# Patient Record
Sex: FEMALE | Race: WHITE | NOT HISPANIC OR LATINO | Employment: STUDENT | URBAN - METROPOLITAN AREA
[De-identification: names, ages, dates, MRNs, and addresses within clinical notes are randomized per-mention and may not be internally consistent; named-entity substitution may affect disease eponyms.]

---
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
## 2020-01-02 ENCOUNTER — OFFICE VISIT (OUTPATIENT)
Dept: FAMILY MEDICINE CLINIC | Facility: CLINIC | Age: 20
End: 2020-01-02
Payer: COMMERCIAL

## 2020-01-02 VITALS
HEART RATE: 105 BPM | HEIGHT: 67 IN | OXYGEN SATURATION: 98 % | RESPIRATION RATE: 18 BRPM | SYSTOLIC BLOOD PRESSURE: 100 MMHG | BODY MASS INDEX: 19.62 KG/M2 | WEIGHT: 125 LBS | DIASTOLIC BLOOD PRESSURE: 60 MMHG | TEMPERATURE: 97.8 F

## 2020-01-02 DIAGNOSIS — B34.9 VIRAL ILLNESS: Primary | ICD-10-CM

## 2020-01-02 PROCEDURE — 99213 OFFICE O/P EST LOW 20 MIN: CPT | Performed by: NURSE PRACTITIONER

## 2020-01-02 RX ORDER — BENZONATATE 100 MG/1
100 CAPSULE ORAL 3 TIMES DAILY PRN
Qty: 20 CAPSULE | Refills: 0 | Status: SHIPPED | OUTPATIENT
Start: 2020-01-02 | End: 2020-05-08 | Stop reason: ALTCHOICE

## 2020-01-02 NOTE — PROGRESS NOTES
Assessment/Plan:  Supportive care discussed and advised, will follow up for any worsening or no improvement  1  Viral illness  -     benzonatate (TESSALON PERLES) 100 mg capsule; Take 1 capsule (100 mg total) by mouth 3 (three) times a day as needed for cough          BMI Counseling: Body mass index is 19 87 kg/m²  Discussed the patient's BMI with her  Patient Instructions:  Increase fluid intake, saline nasal rinses, and hot tea with honey and lemon  Cool air humidification can be helpful as well  May take Ibuprofen or Tylenol as needed for pain or fevers  Mucinex D for sinus congestion or Coricidin HBP if you have high blood pressure or a heart condition  Mucinex or Robitussin DM are effective for cough and chest congestion  Supportive care discussed and advised  Advised to RTO for any worsening and no improvement  Follow up for no improvement and worsening of conditions  Patient advised and educated when to see immediate medical care  Return if symptoms worsen or fail to improve  No future appointments  Subjective:      Patient ID: Raheel Shen is a 23 y o  female  Chief Complaint   Patient presents with    Cough     harsh productive    Fatigue    Generalized Body Aches    Headache    Dizziness     symptoms for the past 2 days  klpn         Vitals:  /60   Pulse 105   Temp 97 8 °F (36 6 °C)   Resp 18   Ht 5' 6 5" (1 689 m)   Wt 56 7 kg (125 lb)   LMP 12/18/2019 (Approximate)   SpO2 98%   BMI 19 87 kg/m²     HPI  Patient stated that started with cough, fatigue, headache, body aches and dizziness on 12/31/2019  Denies fever, chills and sob  Taking advil but no cough medication            PHQ-9 Depression Screening    PHQ-9:    Frequency of the following problems over the past two weeks:       Little interest or pleasure in doing things:  0 - not at all  Feeling down, depressed, or hopeless:  0 - not at all  PHQ-2 Score:  0             The following portions of the patient's history were reviewed and updated as appropriate: allergies, current medications, past family history, past medical history, past social history, past surgical history and problem list       Review of Systems   Constitutional: Positive for fatigue  Negative for chills, diaphoresis, fever and unexpected weight change  HENT: Negative for congestion, dental problem, drooling, ear discharge, ear pain, facial swelling, hearing loss, mouth sores, nosebleeds, postnasal drip, rhinorrhea, sinus pressure, sinus pain, sneezing, sore throat, tinnitus, trouble swallowing and voice change  Respiratory: Positive for cough  Negative for chest tightness, shortness of breath and wheezing  Cardiovascular: Negative  Gastrointestinal: Negative for abdominal pain, constipation, diarrhea, nausea and vomiting  Musculoskeletal: Positive for myalgias  Skin: Negative  Neurological: Positive for dizziness and headaches  Negative for weakness and light-headedness  Hematological: Negative  Objective:    Social History     Tobacco Use   Smoking Status Never Smoker   Smokeless Tobacco Never Used       Allergies:    Allergies   Allergen Reactions    Clarithromycin          Current Outpatient Medications   Medication Sig Dispense Refill    B Complex Vitamins (B COMPLEX 1 PO) Take 1 capsule by mouth daily      Cholecalciferol (VITAMIN D) 2000 units CAPS Take by mouth daily      cyanocobalamin (VITAMIN B-12) 100 mcg tablet Take 100 mcg by mouth daily      Multiple Vitamins-Minerals (MULTIVITAMIN GUMMIES ADULT PO) Take 1 capsule by mouth daily      benzonatate (TESSALON PERLES) 100 mg capsule Take 1 capsule (100 mg total) by mouth 3 (three) times a day as needed for cough 20 capsule 0    sertraline (ZOLOFT) 25 mg tablet Take 1 tablet (25 mg total) by mouth daily (Patient not taking: Reported on 1/2/2020) 30 tablet 1    tobramycin-dexamethasone (TOBRADEX) ophthalmic suspension Administer 1 drop into the left eye every 4 (four) hours while awake (Patient not taking: Reported on 1/2/2020) 5 mL 0     No current facility-administered medications for this visit  Physical Exam   Constitutional: She is oriented to person, place, and time  She appears well-developed and well-nourished  HENT:   Head: Normocephalic  Right Ear: Tympanic membrane, external ear and ear canal normal    Left Ear: Tympanic membrane, external ear and ear canal normal    Nose: Nose normal  Right sinus exhibits no maxillary sinus tenderness and no frontal sinus tenderness  Left sinus exhibits no maxillary sinus tenderness and no frontal sinus tenderness  Mouth/Throat: Oropharynx is clear and moist and mucous membranes are normal    Neck: Neck supple  Cardiovascular: Normal rate, regular rhythm and normal heart sounds  Pulmonary/Chest: Effort normal and breath sounds normal    Abdominal: Normal appearance and bowel sounds are normal  There is no hepatosplenomegaly  There is no tenderness  There is no rebound  Musculoskeletal: Normal range of motion  Lymphadenopathy:        Right cervical: No superficial cervical and no posterior cervical adenopathy present  Left cervical: No superficial cervical and no posterior cervical adenopathy present  Neurological: She is alert and oriented to person, place, and time  Skin: Skin is warm and dry  Psychiatric: She has a normal mood and affect  Her behavior is normal  Judgment and thought content normal    Vitals reviewed                    PARDEEP Urrutia

## 2020-01-02 NOTE — PATIENT INSTRUCTIONS
Increase fluid intake, saline nasal rinses, and hot tea with honey and lemon  Cool air humidification can be helpful as well  May take Ibuprofen or Tylenol as needed for pain or fevers  Mucinex D for sinus congestion or Coricidin HBP if you have high blood pressure or a heart condition  Mucinex or Robitussin DM are effective for cough and chest congestion  Supportive care discussed and advised  Advised to RTO for any worsening and no improvement  Follow up for no improvement and worsening of conditions  Patient advised and educated when to see immediate medical care

## 2020-01-09 ENCOUNTER — TELEPHONE (OUTPATIENT)
Dept: FAMILY MEDICINE CLINIC | Facility: CLINIC | Age: 20
End: 2020-01-09

## 2020-01-09 ENCOUNTER — OFFICE VISIT (OUTPATIENT)
Dept: FAMILY MEDICINE CLINIC | Facility: CLINIC | Age: 20
End: 2020-01-09
Payer: COMMERCIAL

## 2020-01-09 VITALS
BODY MASS INDEX: 19.3 KG/M2 | HEART RATE: 76 BPM | RESPIRATION RATE: 16 BRPM | SYSTOLIC BLOOD PRESSURE: 100 MMHG | HEIGHT: 67 IN | OXYGEN SATURATION: 98 % | WEIGHT: 123 LBS | DIASTOLIC BLOOD PRESSURE: 60 MMHG | TEMPERATURE: 97.8 F

## 2020-01-09 DIAGNOSIS — J06.9 ACUTE URI: Primary | ICD-10-CM

## 2020-01-09 PROCEDURE — 99213 OFFICE O/P EST LOW 20 MIN: CPT | Performed by: NURSE PRACTITIONER

## 2020-01-09 RX ORDER — CEFUROXIME AXETIL 250 MG/1
250 TABLET ORAL EVERY 12 HOURS SCHEDULED
Qty: 14 TABLET | Refills: 0 | Status: SHIPPED | OUTPATIENT
Start: 2020-01-09 | End: 2020-01-16

## 2020-01-09 RX ORDER — DEXTROMETHORPHAN HYDROBROMIDE AND PROMETHAZINE HYDROCHLORIDE 15; 6.25 MG/5ML; MG/5ML
5 SOLUTION ORAL 4 TIMES DAILY PRN
Qty: 473 ML | Refills: 0 | Status: SHIPPED | OUTPATIENT
Start: 2020-01-09 | End: 2020-05-08 | Stop reason: ALTCHOICE

## 2020-01-09 NOTE — PROGRESS NOTES
Assessment/Plan:    1  Acute URI  -     Promethazine-DM (PHENERGAN-DM) 6 25-15 mg/5 mL oral syrup; Take 5 mL by mouth 4 (four) times a day as needed for cough  -     cefuroxime (CEFTIN) 250 mg tablet; Take 1 tablet (250 mg total) by mouth every 12 (twelve) hours for 7 days          BMI Counseling: Body mass index is 19 56 kg/m²  Discussed the patient's BMI with her  Patient Instructions: Take medication with food  It is important that you take the entire course of antibiotics prescribed  May also take a probiotic of your choice to maintain healthy GI sreekanth  Can take some probiotic and yogurt with the medication  Supportive care discussed and advised  Advised to RTO for any worsening and no improvement  Follow up for no improvement and worsening of conditions  Patient advised and educated when to see immediate medical care  Return if symptoms worsen or fail to improve  No future appointments  Subjective:      Patient ID: Gemini Restrepo is a 23 y o  female  Chief Complaint   Patient presents with    Follow-up     was here last week, flu like symptoms  says she isnt feeling better jlopezcma          Vitals:  /60   Pulse 76   Temp 97 8 °F (36 6 °C)   Resp 16   Ht 5' 6 5" (1 689 m)   Wt 55 8 kg (123 lb)   LMP 01/03/2020 (Exact Date)   SpO2 98%   BMI 19 56 kg/m²     HPI  Patient stated that feeling better but cough is not improving at all  Denies fever, chills and sob  Taking mucinex            PHQ-9 Depression Screening    PHQ-9:    Frequency of the following problems over the past two weeks:       Little interest or pleasure in doing things:  0 - not at all  Feeling down, depressed, or hopeless:  0 - not at all  PHQ-2 Score:  0             The following portions of the patient's history were reviewed and updated as appropriate: allergies, current medications, past family history, past medical history, past social history, past surgical history and problem list       Review of Systems   Constitutional: Negative for chills, diaphoresis, fatigue, fever and unexpected weight change  HENT: Positive for congestion  Negative for dental problem, drooling, ear discharge, ear pain, facial swelling, hearing loss, mouth sores, nosebleeds, postnasal drip, rhinorrhea, sinus pressure, sinus pain, sneezing, sore throat, tinnitus, trouble swallowing and voice change  Respiratory: Positive for cough  Negative for chest tightness, shortness of breath and wheezing  Cardiovascular: Negative  Gastrointestinal: Negative for abdominal pain, constipation, diarrhea, nausea and vomiting  Musculoskeletal: Negative  Skin: Negative  Neurological: Negative for dizziness, weakness, light-headedness and headaches  Hematological: Negative  Objective:    Social History     Tobacco Use   Smoking Status Never Smoker   Smokeless Tobacco Never Used       Allergies:    Allergies   Allergen Reactions    Clarithromycin          Current Outpatient Medications   Medication Sig Dispense Refill    B Complex Vitamins (B COMPLEX 1 PO) Take 1 capsule by mouth daily      Cholecalciferol (VITAMIN D) 2000 units CAPS Take by mouth daily      cyanocobalamin (VITAMIN B-12) 100 mcg tablet Take 100 mcg by mouth daily      Multiple Vitamins-Minerals (MULTIVITAMIN GUMMIES ADULT PO) Take 1 capsule by mouth daily      benzonatate (TESSALON PERLES) 100 mg capsule Take 1 capsule (100 mg total) by mouth 3 (three) times a day as needed for cough (Patient not taking: Reported on 1/9/2020) 20 capsule 0    cefuroxime (CEFTIN) 250 mg tablet Take 1 tablet (250 mg total) by mouth every 12 (twelve) hours for 7 days 14 tablet 0    Promethazine-DM (PHENERGAN-DM) 6 25-15 mg/5 mL oral syrup Take 5 mL by mouth 4 (four) times a day as needed for cough 473 mL 0    sertraline (ZOLOFT) 25 mg tablet Take 1 tablet (25 mg total) by mouth daily (Patient not taking: Reported on 1/2/2020) 30 tablet 1    tobramycin-dexamethasone (TOBRADEX) ophthalmic suspension Administer 1 drop into the left eye every 4 (four) hours while awake (Patient not taking: Reported on 1/2/2020) 5 mL 0     No current facility-administered medications for this visit  Physical Exam   Constitutional: She is oriented to person, place, and time  She appears well-developed and well-nourished  HENT:   Head: Normocephalic  Right Ear: Tympanic membrane, external ear and ear canal normal    Left Ear: Tympanic membrane, external ear and ear canal normal    Nose: Nose normal  Right sinus exhibits no maxillary sinus tenderness and no frontal sinus tenderness  Left sinus exhibits no maxillary sinus tenderness and no frontal sinus tenderness  Mouth/Throat: Oropharynx is clear and moist and mucous membranes are normal    Neck: Neck supple  Cardiovascular: Normal rate, regular rhythm and normal heart sounds  Pulmonary/Chest: Effort normal and breath sounds normal    Abdominal: Normal appearance and bowel sounds are normal  There is no hepatosplenomegaly  There is no tenderness  There is no rebound  Musculoskeletal: Normal range of motion  Lymphadenopathy:        Right cervical: No superficial cervical and no posterior cervical adenopathy present  Left cervical: No superficial cervical and no posterior cervical adenopathy present  Neurological: She is alert and oriented to person, place, and time  Skin: Skin is warm and dry  Psychiatric: She has a normal mood and affect  Her behavior is normal  Judgment and thought content normal    Vitals reviewed                    Enrigue Collet, CRNP

## 2020-01-09 NOTE — TELEPHONE ENCOUNTER
Pharmacist left a message that the Promethazine DM syrup  is unavailable  (back ordered from their )  They are asking that you please change to something else   Sherwin He

## 2020-01-09 NOTE — PATIENT INSTRUCTIONS
Take medication with food  It is important that you take the entire course of antibiotics prescribed  May also take a probiotic of your choice to maintain healthy GI sreekanth  Can take some probiotic and yogurt with the medication  Supportive care discussed and advised  Advised to RTO for any worsening and no improvement  Follow up for no improvement and worsening of conditions  Patient advised and educated when to see immediate medical care

## 2020-01-31 ENCOUNTER — OFFICE VISIT (OUTPATIENT)
Dept: FAMILY MEDICINE CLINIC | Facility: CLINIC | Age: 20
End: 2020-01-31
Payer: COMMERCIAL

## 2020-01-31 VITALS
TEMPERATURE: 97.6 F | BODY MASS INDEX: 19.62 KG/M2 | DIASTOLIC BLOOD PRESSURE: 62 MMHG | RESPIRATION RATE: 16 BRPM | HEART RATE: 92 BPM | HEIGHT: 67 IN | SYSTOLIC BLOOD PRESSURE: 98 MMHG | WEIGHT: 125 LBS

## 2020-01-31 DIAGNOSIS — B97.89 VIRAL RESPIRATORY ILLNESS: Primary | ICD-10-CM

## 2020-01-31 DIAGNOSIS — J98.8 VIRAL RESPIRATORY ILLNESS: Primary | ICD-10-CM

## 2020-01-31 LAB — S PYO AG THROAT QL: NEGATIVE

## 2020-01-31 PROCEDURE — 87880 STREP A ASSAY W/OPTIC: CPT | Performed by: NURSE PRACTITIONER

## 2020-01-31 PROCEDURE — 3008F BODY MASS INDEX DOCD: CPT | Performed by: NURSE PRACTITIONER

## 2020-01-31 PROCEDURE — 99213 OFFICE O/P EST LOW 20 MIN: CPT | Performed by: NURSE PRACTITIONER

## 2020-01-31 NOTE — PROGRESS NOTES
Assessment/Plan:  Supportive care for viral illness discussed  Will follow up if no improvement  1  Viral respiratory illness  -     POCT rapid strepA      Recent Results (from the past 24 hour(s))   POCT rapid strepA    Collection Time: 01/31/20 11:29 AM   Result Value Ref Range     RAPID STREP A Negative Negative         BMI Counseling: Body mass index is 19 72 kg/m²  Discussed the patient's BMI with her  Patient Instructions:  Increase fluid intake, saline nasal rinses, and hot tea with honey and lemon  Cool air humidification can be helpful as well  May take Ibuprofen or Tylenol as needed for pain or fevers  Mucinex D for sinus congestion or Coricidin HBP if you have high blood pressure or a heart condition  Mucinex or Robitussin DM are effective for cough and chest congestion  Supportive care discussed and advised  Advised to RTO for any worsening and no improvement  Follow up for no improvement and worsening of conditions  Patient advised and educated when to see immediate medical care  Return if symptoms worsen or fail to improve  No future appointments  Subjective:      Patient ID: David Morse is a 23 y o  female  Chief Complaint   Patient presents with    Sore Throat     Started a few days ago with sore throat, cough and green phlegm  No known fever  No recent travel  [id number]         Vitals:  BP 98/62   Pulse 92   Temp 97 6 °F (36 4 °C)   Resp 16   Ht 5' 6 75" (1 695 m)   Wt 56 7 kg (125 lb)   LMP 01/03/2020 (Exact Date)   BMI 19 72 kg/m²     HPI  Patient stated that started with sore throat couple of days ago with cough which is productive at times  Denies fever, chills and sob  Not taking any OTC  Stated that noticed some discharge at back of her throat in morning         The following portions of the patient's history were reviewed and updated as appropriate: allergies, current medications, past family history, past medical history, past social history, past surgical history and problem list       Review of Systems   Constitutional: Negative for chills, diaphoresis, fatigue, fever and unexpected weight change  HENT: Positive for congestion and sore throat  Negative for dental problem, drooling, ear discharge, ear pain, facial swelling, hearing loss, mouth sores, nosebleeds, postnasal drip, rhinorrhea, sinus pressure, sinus pain, sneezing, tinnitus, trouble swallowing and voice change  Respiratory: Positive for cough  Negative for chest tightness, shortness of breath and wheezing  Cardiovascular: Negative  Gastrointestinal: Negative for abdominal pain, constipation, diarrhea, nausea and vomiting  Musculoskeletal: Negative  Skin: Negative  Neurological: Negative for dizziness, weakness, light-headedness and headaches  Hematological: Negative  Objective:    Social History     Tobacco Use   Smoking Status Never Smoker   Smokeless Tobacco Never Used       Allergies:    Allergies   Allergen Reactions    Clarithromycin          Current Outpatient Medications   Medication Sig Dispense Refill    B Complex Vitamins (B COMPLEX 1 PO) Take 1 capsule by mouth daily      Cholecalciferol (VITAMIN D) 2000 units CAPS Take by mouth daily      cyanocobalamin (VITAMIN B-12) 100 mcg tablet Take 100 mcg by mouth daily      Multiple Vitamins-Minerals (MULTIVITAMIN GUMMIES ADULT PO) Take 1 capsule by mouth daily      benzonatate (TESSALON PERLES) 100 mg capsule Take 1 capsule (100 mg total) by mouth 3 (three) times a day as needed for cough (Patient not taking: Reported on 1/9/2020) 20 capsule 0    Promethazine-DM (PHENERGAN-DM) 6 25-15 mg/5 mL oral syrup Take 5 mL by mouth 4 (four) times a day as needed for cough (Patient not taking: Reported on 1/31/2020) 473 mL 0    sertraline (ZOLOFT) 25 mg tablet Take 1 tablet (25 mg total) by mouth daily (Patient not taking: Reported on 1/2/2020) 30 tablet 1    tobramycin-dexamethasone (TOBRADEX) ophthalmic suspension Administer 1 drop into the left eye every 4 (four) hours while awake (Patient not taking: Reported on 1/2/2020) 5 mL 0     No current facility-administered medications for this visit  Physical Exam   Constitutional: She is oriented to person, place, and time  She appears well-developed and well-nourished  HENT:   Head: Normocephalic  Right Ear: Tympanic membrane, external ear and ear canal normal    Left Ear: Tympanic membrane, external ear and ear canal normal    Nose: Nose normal  Right sinus exhibits no maxillary sinus tenderness and no frontal sinus tenderness  Left sinus exhibits no maxillary sinus tenderness and no frontal sinus tenderness  Mouth/Throat: Oropharynx is clear and moist and mucous membranes are normal    Neck: Neck supple  Cardiovascular: Normal rate, regular rhythm and normal heart sounds  Pulmonary/Chest: Effort normal and breath sounds normal    Abdominal: Normal appearance and bowel sounds are normal  There is no hepatosplenomegaly  There is no tenderness  There is no rebound  Musculoskeletal: Normal range of motion  Lymphadenopathy:        Right cervical: No superficial cervical and no posterior cervical adenopathy present  Left cervical: No superficial cervical and no posterior cervical adenopathy present  Neurological: She is alert and oriented to person, place, and time  Skin: Skin is warm and dry  Psychiatric: She has a normal mood and affect  Her behavior is normal  Judgment and thought content normal    Vitals reviewed                    PARDEEP Goel

## 2020-02-05 ENCOUNTER — OFFICE VISIT (OUTPATIENT)
Dept: FAMILY MEDICINE CLINIC | Facility: CLINIC | Age: 20
End: 2020-02-05
Payer: COMMERCIAL

## 2020-02-05 VITALS
HEART RATE: 104 BPM | SYSTOLIC BLOOD PRESSURE: 98 MMHG | RESPIRATION RATE: 18 BRPM | BODY MASS INDEX: 19.78 KG/M2 | TEMPERATURE: 98.8 F | HEIGHT: 67 IN | WEIGHT: 126 LBS | DIASTOLIC BLOOD PRESSURE: 60 MMHG

## 2020-02-05 DIAGNOSIS — R05.3 PERSISTENT COUGH: ICD-10-CM

## 2020-02-05 DIAGNOSIS — J02.9 EXUDATIVE PHARYNGITIS: Primary | ICD-10-CM

## 2020-02-05 PROCEDURE — 3008F BODY MASS INDEX DOCD: CPT | Performed by: NURSE PRACTITIONER

## 2020-02-05 PROCEDURE — 99213 OFFICE O/P EST LOW 20 MIN: CPT | Performed by: NURSE PRACTITIONER

## 2020-02-05 PROCEDURE — 1036F TOBACCO NON-USER: CPT | Performed by: NURSE PRACTITIONER

## 2020-02-05 RX ORDER — PREDNISONE 10 MG/1
TABLET ORAL
Qty: 20 TABLET | Refills: 0 | Status: SHIPPED | OUTPATIENT
Start: 2020-02-05 | End: 2020-02-05 | Stop reason: ALTCHOICE

## 2020-02-05 RX ORDER — METHYLPREDNISOLONE 4 MG/1
TABLET ORAL
Qty: 21 TABLET | Refills: 0 | Status: SHIPPED | OUTPATIENT
Start: 2020-02-05 | End: 2020-05-08 | Stop reason: ALTCHOICE

## 2020-02-05 RX ORDER — DOXYCYCLINE HYCLATE 100 MG/1
100 CAPSULE ORAL EVERY 12 HOURS SCHEDULED
Qty: 20 CAPSULE | Refills: 0 | Status: SHIPPED | OUTPATIENT
Start: 2020-02-05 | End: 2020-02-15

## 2020-02-05 NOTE — PROGRESS NOTES
Assessment/Plan:  Strongly recommended to get chest xray done to diagnose possible pneumonia and refusing and stated that does not want bill and not necessary but aware the purpose of it  Advised to follow up with ENT if no improvement after current treatment as had recurrent URI's     1  Exudative pharyngitis  -     doxycycline hyclate (VIBRAMYCIN) 100 mg capsule; Take 1 capsule (100 mg total) by mouth every 12 (twelve) hours for 10 days  -     Ambulatory Referral to Otolaryngology; Future  -     methylPREDNISolone 4 MG tablet therapy pack; Use as directed on package    2  Persistent cough  -     doxycycline hyclate (VIBRAMYCIN) 100 mg capsule; Take 1 capsule (100 mg total) by mouth every 12 (twelve) hours for 10 days  -     Ambulatory Referral to Otolaryngology; Future  -     methylPREDNISolone 4 MG tablet therapy pack; Use as directed on package          BMI Counseling: Body mass index is 19 88 kg/m²  Discussed the patient's BMI with her  Patient Instructions: Take medication with food  It is important that you take the entire course of antibiotics prescribed  May also take a probiotic of your choice to maintain healthy GI sreekanth  Can take some probiotic and yogurt with the medication  Supportive care discussed and advised  Advised to RTO for any worsening and no improvement  Follow up for no improvement and worsening of conditions  Patient advised and educated when to see immediate medical care  Return if symptoms worsen or fail to improve  No future appointments  Subjective:      Patient ID: Herbert Martinez is a 23 y o  female      Chief Complaint   Patient presents with    Cough     for 1 week prcma     Nasal Congestion    Sore Throat         Vitals:  BP 98/60   Pulse 104   Temp 98 8 °F (37 1 °C)   Resp 18   Ht 5' 6 75" (1 695 m)   Wt 57 2 kg (126 lb)   BMI 19 88 kg/m²     HPI  Patient was seen couple of days ago and stated that her cough, sore throat and congestion is worse   Stated that having yellowish discharge from her nose  Denies fever, chills and sob  Not taking any OTC  The following portions of the patient's history were reviewed and updated as appropriate: allergies, current medications, past family history, past medical history, past social history, past surgical history and problem list       Review of Systems   Constitutional: Negative for chills, diaphoresis, fatigue, fever and unexpected weight change  HENT: Positive for congestion and sore throat  Negative for dental problem, drooling, ear discharge, ear pain, facial swelling, hearing loss, mouth sores, nosebleeds, postnasal drip, rhinorrhea, sinus pressure, sinus pain, sneezing, tinnitus, trouble swallowing and voice change  Respiratory: Positive for cough  Negative for chest tightness, shortness of breath and wheezing  Cardiovascular: Negative  Gastrointestinal: Negative for abdominal pain, constipation, diarrhea, nausea and vomiting  Musculoskeletal: Negative  Skin: Negative  Neurological: Negative for dizziness, weakness, light-headedness and headaches  Hematological: Negative  Objective:    Social History     Tobacco Use   Smoking Status Never Smoker   Smokeless Tobacco Never Used       Allergies:    Allergies   Allergen Reactions    Clarithromycin          Current Outpatient Medications   Medication Sig Dispense Refill    B Complex Vitamins (B COMPLEX 1 PO) Take 1 capsule by mouth daily      Cholecalciferol (VITAMIN D) 2000 units CAPS Take by mouth daily      cyanocobalamin (VITAMIN B-12) 100 mcg tablet Take 100 mcg by mouth daily      Multiple Vitamins-Minerals (MULTIVITAMIN GUMMIES ADULT PO) Take 1 capsule by mouth daily      benzonatate (TESSALON PERLES) 100 mg capsule Take 1 capsule (100 mg total) by mouth 3 (three) times a day as needed for cough (Patient not taking: Reported on 1/9/2020) 20 capsule 0    doxycycline hyclate (VIBRAMYCIN) 100 mg capsule Take 1 capsule (100 mg total) by mouth every 12 (twelve) hours for 10 days 20 capsule 0    methylPREDNISolone 4 MG tablet therapy pack Use as directed on package 21 tablet 0    Promethazine-DM (PHENERGAN-DM) 6 25-15 mg/5 mL oral syrup Take 5 mL by mouth 4 (four) times a day as needed for cough (Patient not taking: Reported on 1/31/2020) 473 mL 0    sertraline (ZOLOFT) 25 mg tablet Take 1 tablet (25 mg total) by mouth daily (Patient not taking: Reported on 1/2/2020) 30 tablet 1    tobramycin-dexamethasone (TOBRADEX) ophthalmic suspension Administer 1 drop into the left eye every 4 (four) hours while awake (Patient not taking: Reported on 1/2/2020) 5 mL 0     No current facility-administered medications for this visit  Physical Exam   Constitutional: She is oriented to person, place, and time  She appears well-developed and well-nourished  HENT:   Head: Normocephalic  Right Ear: Tympanic membrane, external ear and ear canal normal    Left Ear: Tympanic membrane, external ear and ear canal normal    Nose: Nose normal  Right sinus exhibits no maxillary sinus tenderness and no frontal sinus tenderness  Left sinus exhibits no maxillary sinus tenderness and no frontal sinus tenderness  Mouth/Throat: Mucous membranes are normal  Oropharyngeal exudate and posterior oropharyngeal erythema present  No posterior oropharyngeal edema  Neck: Neck supple  Cardiovascular: Normal rate, regular rhythm and normal heart sounds  Pulmonary/Chest: Effort normal and breath sounds normal    Abdominal: Normal appearance and bowel sounds are normal  There is no hepatosplenomegaly  There is no tenderness  There is no rebound  Musculoskeletal: Normal range of motion  Lymphadenopathy:        Right cervical: No superficial cervical and no posterior cervical adenopathy present  Left cervical: No superficial cervical and no posterior cervical adenopathy present     Neurological: She is alert and oriented to person, place, and time  Skin: Skin is warm and dry  Psychiatric: She has a normal mood and affect  Her behavior is normal  Judgment and thought content normal    Vitals reviewed                    PARDEEP Fox

## 2020-03-05 ENCOUNTER — OFFICE VISIT (OUTPATIENT)
Dept: FAMILY MEDICINE CLINIC | Facility: CLINIC | Age: 20
End: 2020-03-05
Payer: COMMERCIAL

## 2020-03-05 VITALS
OXYGEN SATURATION: 98 % | SYSTOLIC BLOOD PRESSURE: 98 MMHG | TEMPERATURE: 98.4 F | WEIGHT: 129 LBS | HEIGHT: 67 IN | HEART RATE: 92 BPM | BODY MASS INDEX: 20.25 KG/M2 | RESPIRATION RATE: 16 BRPM | DIASTOLIC BLOOD PRESSURE: 60 MMHG

## 2020-03-05 DIAGNOSIS — R21 RASH: Primary | ICD-10-CM

## 2020-03-05 PROCEDURE — 99213 OFFICE O/P EST LOW 20 MIN: CPT | Performed by: NURSE PRACTITIONER

## 2020-03-05 PROCEDURE — 3008F BODY MASS INDEX DOCD: CPT | Performed by: NURSE PRACTITIONER

## 2020-03-05 PROCEDURE — 1036F TOBACCO NON-USER: CPT | Performed by: NURSE PRACTITIONER

## 2020-03-05 RX ORDER — CLOTRIMAZOLE AND BETAMETHASONE DIPROPIONATE 10; .64 MG/G; MG/G
CREAM TOPICAL 2 TIMES DAILY
Qty: 45 G | Refills: 0 | Status: SHIPPED | OUTPATIENT
Start: 2020-03-05 | End: 2020-05-08 | Stop reason: ALTCHOICE

## 2020-03-05 NOTE — PROGRESS NOTES
Assessment/Plan:    1  Rash  -     clotrimazole-betamethasone (LOTRISONE) 1-0 05 % cream; Apply topically 2 (two) times a day Short term, sparingly to area: on left axillary          BMI Counseling: Body mass index is 20 36 kg/m²  Discussed the patient's BMI with her  Patient Instructions: Take shower immediately after gym  Air dry area  Supportive care discussed and advised  Advised to RTO for any worsening and no improvement  Follow up for no improvement and worsening of conditions  Patient advised and educated when to see immediate medical care  Return if symptoms worsen or fail to improve  No future appointments  Subjective:      Patient ID: Gloria Spangler is a 23 y o  female  Chief Complaint   Patient presents with    Rash     rash in left armpit for about a week jlopezcma          Vitals:  BP 98/60   Pulse 92   Temp 98 4 °F (36 9 °C)   Resp 16   Ht 5' 6 75" (1 695 m)   Wt 58 5 kg (129 lb)   LMP 02/05/2020 (Approximate)   SpO2 98%   BMI 20 36 kg/m²     HPI  Patient stated that started with rash in left axillary area about a week ago  Stated that been going to gym and sweating a lot  Rash is itchy  Denies any fever, and chills  Stated that also feeling mild discomfort on the left eye upper eyelid like heaviness but no vision changes        PHQ-9 Depression Screening    PHQ-9:    Frequency of the following problems over the past two weeks:       Little interest or pleasure in doing things:  0 - not at all  Feeling down, depressed, or hopeless:  0 - not at all  PHQ-2 Score:  0             The following portions of the patient's history were reviewed and updated as appropriate: allergies, current medications, past family history, past medical history, past social history, past surgical history and problem list       Review of Systems   Constitutional: Negative  Eyes:        As noted in HPI     Respiratory: Negative  Cardiovascular: Negative      Genitourinary: Negative  Skin: Positive for rash  Psychiatric/Behavioral: Negative  Objective:    Social History     Tobacco Use   Smoking Status Never Smoker   Smokeless Tobacco Never Used       Allergies: Allergies   Allergen Reactions    Clarithromycin          Current Outpatient Medications   Medication Sig Dispense Refill    B Complex Vitamins (B COMPLEX 1 PO) Take 1 capsule by mouth daily      Cholecalciferol (VITAMIN D) 2000 units CAPS Take by mouth daily      cyanocobalamin (VITAMIN B-12) 100 mcg tablet Take 100 mcg by mouth daily      Multiple Vitamins-Minerals (MULTIVITAMIN GUMMIES ADULT PO) Take 1 capsule by mouth daily      benzonatate (TESSALON PERLES) 100 mg capsule Take 1 capsule (100 mg total) by mouth 3 (three) times a day as needed for cough (Patient not taking: Reported on 1/9/2020) 20 capsule 0    clotrimazole-betamethasone (LOTRISONE) 1-0 05 % cream Apply topically 2 (two) times a day Short term, sparingly to area: on left axillary 45 g 0    methylPREDNISolone 4 MG tablet therapy pack Use as directed on package (Patient not taking: Reported on 3/5/2020) 21 tablet 0    Promethazine-DM (PHENERGAN-DM) 6 25-15 mg/5 mL oral syrup Take 5 mL by mouth 4 (four) times a day as needed for cough (Patient not taking: Reported on 1/31/2020) 473 mL 0    sertraline (ZOLOFT) 25 mg tablet Take 1 tablet (25 mg total) by mouth daily (Patient not taking: Reported on 1/2/2020) 30 tablet 1    tobramycin-dexamethasone (TOBRADEX) ophthalmic suspension Administer 1 drop into the left eye every 4 (four) hours while awake (Patient not taking: Reported on 1/2/2020) 5 mL 0     No current facility-administered medications for this visit  Physical Exam   Constitutional: She is oriented to person, place, and time  She appears well-developed and well-nourished  Eyes: Pupils are equal, round, and reactive to light   Conjunctivae and lids are normal    Cardiovascular: Normal rate, regular rhythm and normal heart sounds  Pulmonary/Chest: Effort normal and breath sounds normal    Neurological: She is alert and oriented to person, place, and time  Skin: Skin is warm and dry  Rash noted  Pruritic erythematous maculopapular rash noted on left axillary area about 8 cm in diameter  Psychiatric: She has a normal mood and affect   Her behavior is normal  Judgment and thought content normal                    PARDEEP Jiménez

## 2020-03-05 NOTE — PATIENT INSTRUCTIONS
Take shower immediately after gym  Air dry area  Supportive care discussed and advised  Advised to RTO for any worsening and no improvement  Follow up for no improvement and worsening of conditions  Patient advised and educated when to see immediate medical care

## 2020-05-01 ENCOUNTER — TELEPHONE (OUTPATIENT)
Dept: FAMILY MEDICINE CLINIC | Facility: CLINIC | Age: 20
End: 2020-05-01

## 2020-05-06 ENCOUNTER — TELEPHONE (OUTPATIENT)
Dept: FAMILY MEDICINE CLINIC | Facility: CLINIC | Age: 20
End: 2020-05-06

## 2020-05-08 ENCOUNTER — OFFICE VISIT (OUTPATIENT)
Dept: FAMILY MEDICINE CLINIC | Facility: CLINIC | Age: 20
End: 2020-05-08
Payer: COMMERCIAL

## 2020-05-08 VITALS
HEART RATE: 84 BPM | HEIGHT: 67 IN | TEMPERATURE: 99.1 F | WEIGHT: 140 LBS | DIASTOLIC BLOOD PRESSURE: 60 MMHG | SYSTOLIC BLOOD PRESSURE: 96 MMHG | BODY MASS INDEX: 21.97 KG/M2

## 2020-05-08 DIAGNOSIS — D36.9 DERMOID CYST: Primary | ICD-10-CM

## 2020-05-08 DIAGNOSIS — F41.1 GENERALIZED ANXIETY DISORDER: ICD-10-CM

## 2020-05-08 PROCEDURE — 1036F TOBACCO NON-USER: CPT | Performed by: FAMILY MEDICINE

## 2020-05-08 PROCEDURE — 99213 OFFICE O/P EST LOW 20 MIN: CPT | Performed by: FAMILY MEDICINE

## 2020-05-08 PROCEDURE — 3008F BODY MASS INDEX DOCD: CPT | Performed by: FAMILY MEDICINE

## 2020-05-08 RX ORDER — ESCITALOPRAM OXALATE 10 MG/1
10 TABLET ORAL DAILY
Qty: 30 TABLET | Refills: 1 | Status: SHIPPED | OUTPATIENT
Start: 2020-05-08 | End: 2020-06-01

## 2020-06-01 DIAGNOSIS — F41.1 GENERALIZED ANXIETY DISORDER: ICD-10-CM

## 2020-06-01 RX ORDER — ESCITALOPRAM OXALATE 10 MG/1
TABLET ORAL
Qty: 30 TABLET | Refills: 1 | Status: SHIPPED | OUTPATIENT
Start: 2020-06-01 | End: 2020-06-08

## 2020-06-07 DIAGNOSIS — F41.1 GENERALIZED ANXIETY DISORDER: ICD-10-CM

## 2020-06-08 RX ORDER — ESCITALOPRAM OXALATE 10 MG/1
10 TABLET ORAL DAILY
Qty: 90 TABLET | Refills: 0 | Status: SHIPPED | OUTPATIENT
Start: 2020-06-08 | End: 2020-09-01 | Stop reason: ALTCHOICE

## 2020-06-08 RX ORDER — ESCITALOPRAM OXALATE 10 MG/1
TABLET ORAL
Qty: 90 TABLET | Refills: 0 | Status: SHIPPED | OUTPATIENT
Start: 2020-06-08 | End: 2020-06-08 | Stop reason: SDUPTHER

## 2020-06-15 ENCOUNTER — TELEPHONE (OUTPATIENT)
Dept: FAMILY MEDICINE CLINIC | Facility: CLINIC | Age: 20
End: 2020-06-15

## 2020-06-26 ENCOUNTER — OFFICE VISIT (OUTPATIENT)
Dept: FAMILY MEDICINE CLINIC | Facility: CLINIC | Age: 20
End: 2020-06-26
Payer: COMMERCIAL

## 2020-06-26 VITALS
HEART RATE: 82 BPM | SYSTOLIC BLOOD PRESSURE: 104 MMHG | TEMPERATURE: 97.3 F | WEIGHT: 135 LBS | BODY MASS INDEX: 21.19 KG/M2 | RESPIRATION RATE: 18 BRPM | HEIGHT: 67 IN | DIASTOLIC BLOOD PRESSURE: 72 MMHG
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
## 2020-06-26 PROBLEM — J20.9 ACUTE BRONCHITIS: Status: RESOLVED | Noted: 2018-12-12 | Resolved: 2020-06-26

## 2020-06-26 PROBLEM — H60.391 INFECTION OF RIGHT EAR LOBE: Status: RESOLVED | Noted: 2018-12-12 | Resolved: 2020-06-26

## 2020-06-26 PROCEDURE — 1036F TOBACCO NON-USER: CPT | Performed by: NURSE PRACTITIONER

## 2020-06-26 PROCEDURE — 99213 OFFICE O/P EST LOW 20 MIN: CPT | Performed by: NURSE PRACTITIONER

## 2020-06-26 PROCEDURE — 3008F BODY MASS INDEX DOCD: CPT | Performed by: NURSE PRACTITIONER

## 2020-06-26 RX ORDER — DOXYCYCLINE HYCLATE 100 MG/1
200 CAPSULE ORAL ONCE
Qty: 2 CAPSULE | Refills: 0 | Status: SHIPPED | OUTPATIENT
Start: 2020-06-26 | End: 2020-06-26

## 2020-08-31 ENCOUNTER — TELEPHONE (OUTPATIENT)
Dept: FAMILY MEDICINE CLINIC | Facility: CLINIC | Age: 20
End: 2020-08-31

## 2020-09-01 ENCOUNTER — OFFICE VISIT (OUTPATIENT)
Dept: FAMILY MEDICINE CLINIC | Facility: CLINIC | Age: 20
End: 2020-09-01
Payer: COMMERCIAL

## 2020-09-01 VITALS
TEMPERATURE: 95.6 F | HEART RATE: 80 BPM | WEIGHT: 141 LBS | HEIGHT: 67 IN | SYSTOLIC BLOOD PRESSURE: 96 MMHG | BODY MASS INDEX: 22.13 KG/M2 | DIASTOLIC BLOOD PRESSURE: 66 MMHG | RESPIRATION RATE: 16 BRPM

## 2020-09-01 DIAGNOSIS — Z00.00 ROUTINE ADULT HEALTH MAINTENANCE: Primary | ICD-10-CM

## 2020-09-01 DIAGNOSIS — F41.1 GENERALIZED ANXIETY DISORDER: ICD-10-CM

## 2020-09-01 DIAGNOSIS — Z11.3 SCREENING EXAMINATION FOR STD (SEXUALLY TRANSMITTED DISEASE): ICD-10-CM

## 2020-09-01 PROCEDURE — 3725F SCREEN DEPRESSION PERFORMED: CPT | Performed by: NURSE PRACTITIONER

## 2020-09-01 PROCEDURE — 1036F TOBACCO NON-USER: CPT | Performed by: NURSE PRACTITIONER

## 2020-09-01 PROCEDURE — 99395 PREV VISIT EST AGE 18-39: CPT | Performed by: NURSE PRACTITIONER

## 2020-09-01 RX ORDER — SERTRALINE HYDROCHLORIDE 25 MG/1
25 TABLET, FILM COATED ORAL DAILY
Qty: 30 TABLET | Refills: 1 | Status: SHIPPED | OUTPATIENT
Start: 2020-09-01 | End: 2020-09-30

## 2020-09-01 NOTE — ASSESSMENT & PLAN NOTE
Did not tolerate Lexapro  She would like to try Zoloft again  She doesn't think she gave it enough time  Will have her RTO in 4-6 weeks for med check

## 2020-09-01 NOTE — PATIENT INSTRUCTIONS

## 2020-09-01 NOTE — PROGRESS NOTES
FAMILY PRACTICE HEALTH MAINTENANCE OFFICE VISIT  [facility]    NAME: Jeffy Carl  AGE: 21 y o  SEX: female  : 2000     DATE: 2020    Assessment and Plan     1  Routine adult health maintenance    2  Generalized anxiety disorder  Assessment & Plan:  Did not tolerate Lexapro  She would like to try Zoloft again  She doesn't think she gave it enough time  Will have her RTO in 4-6 weeks for med check  Orders:  -     sertraline (ZOLOFT) 25 mg tablet; Take 1 tablet (25 mg total) by mouth daily    3  Screening examination for STD (sexually transmitted disease)  -     Chlamydia/GC amplified DNA by PCR; Future  -     HIV 1/2 w/ Reflex (Multispot); Future  -     Hepatitis panel, acute; Future  -     RPR; Future  -     HIV 1/2 w/ Reflex (Multispot); Future; Expected date: 2021      · Patient Counseling:   · Nutrition: Stressed importance of a well balanced diet, moderation of sodium/saturated fat, caloric balance and sufficient intake of fiber  · Exercise: Stressed the importance of regular exercise with a goal of 150 minutes per week  · Dental Health: Discussed daily flossing and brushing and regular dental visits     · Immunizations reviewed: Declined recommended vaccinations  · Discussed benefits of:  Cervical Cancer screening   BMI Counseling: Body mass index is 22 42 kg/m²  Discussed with patient's BMI with her  The BMI is normal      Return in about 4 weeks (around 2020) for Recheck  Chief Complaint     Chief Complaint   Patient presents with    Physical Exam     lj       History of Present Illness     Here today for CPE with college forms  Attending [address] for [employer]  Feeling well  Requesting STD screening  She was intimate with a partner who had previously used heroin, approx 4 years prior to their relationship  Would like to try Zoloft again for her anxiety    She was previously on Lexapro, but it made her very debby  She was prescribed Zoloft in the past, but only took it for a week  Well Adult Physical   Patient here for a comprehensive physical exam       Diet and Physical Activity  Diet: well balanced diet and vegan    Exercise: frequently      Depression Screen  PHQ-9 Depression Screening    PHQ-9:    Frequency of the following problems over the past two weeks:       Little interest or pleasure in doing things:  0 - not at all  Feeling down, depressed, or hopeless:  0 - not at all  PHQ-2 Score:  0          General Health  Hearing: Normal:  bilateral  Vision: goes for regular eye exams, most recent eye exam <1 year and wears glasses  Dental: regular dental visits and brushes teeth twice daily    Reproductive Health  No issues , Regular Periods and Follows with gynecologist      The following portions of the patient's history were reviewed and updated as appropriate: allergies, current medications, past family history, past medical history, past social history, past surgical history and problem list     Review of Systems     Review of Systems   Constitutional: Negative  Respiratory: Negative  Cardiovascular: Negative  Gastrointestinal: Negative  Neurological: Negative  Psychiatric/Behavioral: Negative          Past Medical History     Past Medical History:   Diagnosis Date    Complication of left ear piercing     last assessed: 09/11/17    Exposure to blood or body fluid     last assessed: 01/31/17    Moderate major depression ([address] )     last assessed: 07/25/17       Past Surgical History     Past Surgical History:   Procedure Laterality Date    FRACTURE SURGERY Left     femur       Social History     Social History     Socioeconomic History    Marital status: Single     Spouse name: None    Number of children: None    Years of education: None    Highest education level: None   Occupational History    None   Social Needs    Financial resource strain: None    Food insecurity     Worry: None     Inability: None    Transportation needs     Medical: None     Non-medical: None   Tobacco Use    Smoking status: Never Smoker    Smokeless tobacco: Never Used   Substance and Sexual Activity    Alcohol use: No    Drug use: No    Sexual activity: Yes     Partners: Male     Comment: pt declines hiv std testing   Lifestyle    Physical activity     Days per week: None     Minutes per session: None    Stress: None   Relationships    Social connections     Talks on phone: None     Gets together: None     Attends Rastafarian service: None     Active member of club or organization: None     Attends meetings of clubs or organizations: None     Relationship status: None    Intimate partner violence     Fear of current or ex partner: None     Emotionally abused: None     Physically abused: None     Forced sexual activity: None   Other Topics Concern    None   Social History Narrative    None       Family History     Family History   Problem Relation Age of Onset    Other Mother         Seizures    Seasonal affective disorder Mother     Gout Father     Hypertension Father        Current Medications       Current Outpatient Medications:     B Complex Vitamins (B COMPLEX 1 PO), Take 1 capsule by mouth daily, Disp: , Rfl:     Cholecalciferol (VITAMIN D) 2000 units CAPS, Take by mouth daily, Disp: , Rfl:     cyanocobalamin (VITAMIN B-12) 100 mcg tablet, Take 100 mcg by mouth daily, Disp: , Rfl:     Multiple Vitamins-Minerals (MULTIVITAMIN GUMMIES ADULT PO), Take 1 capsule by mouth daily, Disp: , Rfl:     sertraline (ZOLOFT) 25 mg tablet, Take 1 tablet (25 mg total) by mouth daily, Disp: 30 tablet, Rfl: 1     Allergies     Allergies   Allergen Reactions    Clarithromycin        Objective     BP 96/66   Pulse 80   Temp (!) 95 6 °F (35 3 °C)   Resp 16   Ht 5' 6 5" (1 689 m)   Wt 64 kg (141 lb)   BMI 22 42 kg/m²      Physical Exam  Vitals signs and nursing note reviewed     Constitutional: Appearance: She is well-developed  HENT:      Head: Normocephalic  Right Ear: External ear normal       Left Ear: External ear normal       Nose: Nose normal    Eyes:      Conjunctiva/sclera: Conjunctivae normal       Pupils: Pupils are equal, round, and reactive to light  Neck:      Musculoskeletal: Neck supple  Thyroid: No thyromegaly  Cardiovascular:      Rate and Rhythm: Normal rate and regular rhythm  Heart sounds: Normal heart sounds  No murmur  Pulmonary:      Effort: Pulmonary effort is normal       Breath sounds: Normal breath sounds  Abdominal:      General: Bowel sounds are normal  There is no distension  Palpations: Abdomen is soft  Musculoskeletal: Normal range of motion  Lymphadenopathy:      Cervical: No cervical adenopathy  Skin:     General: Skin is warm and dry  Neurological:      Mental Status: She is alert and oriented to person, place, and time  Deep Tendon Reflexes: Reflexes are normal and symmetric             Vision Screening Comments: Pt forgot her glasses--unable to correctly see line three--radha Cancino, [address]

## 2020-09-30 DIAGNOSIS — F41.1 GENERALIZED ANXIETY DISORDER: ICD-10-CM

## 2020-09-30 RX ORDER — SERTRALINE HYDROCHLORIDE 25 MG/1
TABLET, FILM COATED ORAL
Qty: 30 TABLET | Refills: 1 | Status: SHIPPED | OUTPATIENT
Start: 2020-09-30 | End: 2020-10-26 | Stop reason: SDUPTHER

## 2020-10-06 ENCOUNTER — TELEPHONE (OUTPATIENT)
Dept: FAMILY MEDICINE CLINIC | Facility: CLINIC | Age: 20
End: 2020-10-06

## 2020-10-08 NOTE — TELEPHONE ENCOUNTER
----- Message from Priscila Spence, RN sent at 10/23/2018  3:54 PM EDT -----  Regarding: needs appt w lamb  lmom      Documentation     Leopoldo Pinta 23 hours ago (4:30 PM)          Pt lmom  lmom for patient to call back tomorrow      Documentation     Leopoldo Pinta 23 hours ago (3:56 PM)          Per Dr Anny Casas, pt needs appt with her or Dr Lillian Brooke  lmom for pt to schedule      Documentation     Lexx Pacheco (3:37 PM)          [facility]  [facility] and transferred to their in house therapist to see if has recommendations or if she does outpt therapy sessions  Documentation     Leopoldo Pinta routed conversation to [facility] Yesterday (3:12 PM)    Leopoldo Pinta Yesterday (3:11 PM)          Looking for recommendation of sex therapist  States number given is longer in service  Mother  Still living? Unknown  Family history of hypertension, Age at diagnosis: Age Unknown

## 2020-10-26 DIAGNOSIS — F41.1 GENERALIZED ANXIETY DISORDER: ICD-10-CM

## 2020-10-26 RX ORDER — SERTRALINE HYDROCHLORIDE 25 MG/1
25 TABLET, FILM COATED ORAL DAILY
Qty: 30 TABLET | Refills: 1 | Status: SHIPPED | OUTPATIENT
Start: 2020-10-26 | End: 2020-11-23

## 2020-11-22 DIAGNOSIS — F41.1 GENERALIZED ANXIETY DISORDER: ICD-10-CM

## 2020-11-23 RX ORDER — SERTRALINE HYDROCHLORIDE 25 MG/1
TABLET, FILM COATED ORAL
Qty: 30 TABLET | Refills: 1 | Status: SHIPPED | OUTPATIENT
Start: 2020-11-23 | End: 2020-12-10 | Stop reason: SDUPTHER

## 2020-12-10 DIAGNOSIS — F41.1 GENERALIZED ANXIETY DISORDER: ICD-10-CM

## 2020-12-10 RX ORDER — SERTRALINE HYDROCHLORIDE 25 MG/1
25 TABLET, FILM COATED ORAL DAILY
Qty: 90 TABLET | Refills: 1 | Status: SHIPPED | OUTPATIENT
Start: 2020-12-10 | End: 2021-01-22

## 2020-12-21 LAB
C TRACH RRNA SPEC QL NAA+PROBE: NOT DETECTED
HAV IGM SERPL QL IA: NORMAL
HBV CORE IGM SERPL QL IA: NORMAL
HBV SURFACE AG SERPL QL IA: NORMAL
HCV AB S/CO SERPL IA: 0.02
HCV AB SERPL QL IA: NORMAL
HIV 1+2 AB+HIV1 P24 AG SERPL QL IA: NORMAL
N GONORRHOEA RRNA SPEC QL NAA+PROBE: NOT DETECTED
RPR SER QL: NORMAL

## 2021-01-22 ENCOUNTER — OFFICE VISIT (OUTPATIENT)
Dept: FAMILY MEDICINE CLINIC | Facility: CLINIC | Age: 21
End: 2021-01-22
Payer: COMMERCIAL

## 2021-01-22 VITALS
WEIGHT: 141 LBS | BODY MASS INDEX: 22.13 KG/M2 | HEIGHT: 67 IN | HEART RATE: 83 BPM | OXYGEN SATURATION: 95 % | TEMPERATURE: 97 F | SYSTOLIC BLOOD PRESSURE: 106 MMHG | DIASTOLIC BLOOD PRESSURE: 68 MMHG | RESPIRATION RATE: 18 BRPM

## 2021-01-22 DIAGNOSIS — Z30.41 ORAL CONTRACEPTIVE PILL SURVEILLANCE: Primary | ICD-10-CM

## 2021-01-22 PROCEDURE — 3008F BODY MASS INDEX DOCD: CPT | Performed by: NURSE PRACTITIONER

## 2021-01-22 PROCEDURE — 99213 OFFICE O/P EST LOW 20 MIN: CPT | Performed by: NURSE PRACTITIONER

## 2021-01-22 PROCEDURE — 1036F TOBACCO NON-USER: CPT | Performed by: NURSE PRACTITIONER

## 2021-01-22 PROCEDURE — 3725F SCREEN DEPRESSION PERFORMED: CPT | Performed by: NURSE PRACTITIONER

## 2021-01-22 RX ORDER — ACETAMINOPHEN AND CODEINE PHOSPHATE 120; 12 MG/5ML; MG/5ML
1 SOLUTION ORAL DAILY
Qty: 90 TABLET | Refills: 1 | Status: SHIPPED | OUTPATIENT
Start: 2021-01-22 | End: 2021-04-14 | Stop reason: SDUPTHER

## 2021-01-22 NOTE — PROGRESS NOTES
Assessment/Plan:    Discussed different birth control options, pt would like to resume Micronor  She is currently menstruating, so will start OCPs after she gets them  Will have her f/u with ob/gyn for pap and further management  1  Oral contraceptive pill surveillance  -     norethindrone (MICRONOR) 0 35 MG tablet; Take 1 tablet (0 35 mg total) by mouth daily            Patient Instructions   Please follow up with your gynecologist after your 21st birthday for cervical cancer screening and to discuss your birth control  Return if symptoms worsen or fail to improve  Subjective:      Patient ID: Laurence Mehta is a 21 y o  female  Chief Complaint   Patient presents with    Contraception     rmklpn       Pt is here today to discuss starting OCPs again  She stopped last year after she broke up with her boyfriend  She is now sexually active again and would like to back on the pil  Was previously managed by her ob/gyn, who she still plans on continuing to see  She was on Micronor due to lower risk for DVT, although she does not have any risk factors  She tolerated this well in the past   She is currently using condoms regularly  The following portions of the patient's history were reviewed and updated as appropriate: allergies, current medications, past family history, past medical history, past social history, past surgical history and problem list     Review of Systems   Constitutional: Negative  Respiratory: Negative  Cardiovascular: Negative  Gastrointestinal: Negative  Genitourinary: Negative  Neurological: Negative            Current Outpatient Medications   Medication Sig Dispense Refill    B Complex Vitamins (B COMPLEX 1 PO) Take 1 capsule by mouth daily      Cholecalciferol (VITAMIN D) 2000 units CAPS Take by mouth daily      Multiple Vitamins-Minerals (MULTIVITAMIN GUMMIES ADULT PO) Take 1 capsule by mouth daily      cyanocobalamin (VITAMIN B-12) 100 mcg tablet Take 100 mcg by mouth daily      norethindrone (MICRONOR) 0 35 MG tablet Take 1 tablet (0 35 mg total) by mouth daily 90 tablet 1     No current facility-administered medications for this visit  Objective:    /68   Pulse 83   Temp (!) 97 °F (36 1 °C)   Resp 18   Ht 5' 6 5" (1 689 m)   Wt 64 kg (141 lb)   LMP 01/22/2021 (Exact Date)   SpO2 95%   BMI 22 42 kg/m²        Physical Exam  Vitals signs and nursing note reviewed  Constitutional:       Appearance: She is well-developed  Cardiovascular:      Rate and Rhythm: Normal rate and regular rhythm  Heart sounds: Normal heart sounds  No murmur  Pulmonary:      Effort: Pulmonary effort is normal       Breath sounds: Normal breath sounds  Skin:     General: Skin is warm and dry  Neurological:      Mental Status: She is alert     Psychiatric:         Mood and Affect: Mood normal          Behavior: Behavior normal                 PARDEEP Fraser

## 2021-01-22 NOTE — PATIENT INSTRUCTIONS
Please follow up with your gynecologist after your 21st birthday for cervical cancer screening and to discuss your birth control

## 2021-02-25 ENCOUNTER — OFFICE VISIT (OUTPATIENT)
Dept: FAMILY MEDICINE CLINIC | Facility: CLINIC | Age: 21
End: 2021-02-25
Payer: COMMERCIAL

## 2021-02-25 VITALS
HEART RATE: 84 BPM | WEIGHT: 140 LBS | HEIGHT: 66 IN | BODY MASS INDEX: 22.5 KG/M2 | TEMPERATURE: 97.6 F | SYSTOLIC BLOOD PRESSURE: 96 MMHG | DIASTOLIC BLOOD PRESSURE: 58 MMHG | RESPIRATION RATE: 16 BRPM

## 2021-02-25 DIAGNOSIS — F41.1 GENERALIZED ANXIETY DISORDER: ICD-10-CM

## 2021-02-25 DIAGNOSIS — H61.23 BILATERAL IMPACTED CERUMEN: Primary | ICD-10-CM

## 2021-02-25 PROCEDURE — 99213 OFFICE O/P EST LOW 20 MIN: CPT | Performed by: NURSE PRACTITIONER

## 2021-02-25 PROCEDURE — 69210 REMOVE IMPACTED EAR WAX UNI: CPT | Performed by: NURSE PRACTITIONER

## 2021-02-25 PROCEDURE — 1036F TOBACCO NON-USER: CPT | Performed by: NURSE PRACTITIONER

## 2021-02-25 PROCEDURE — 3008F BODY MASS INDEX DOCD: CPT | Performed by: NURSE PRACTITIONER

## 2021-02-25 RX ORDER — SERTRALINE HYDROCHLORIDE 25 MG/1
25 TABLET, FILM COATED ORAL DAILY
Qty: 30 TABLET | Refills: 1 | Status: SHIPPED | OUTPATIENT
Start: 2021-02-25 | End: 2021-03-22

## 2021-02-25 NOTE — ASSESSMENT & PLAN NOTE
Well resume Sertraline, recommended she take it at bedtime  Advised f/u in 1 month with PCP for med check

## 2021-02-25 NOTE — PROGRESS NOTES
Assessment/Plan:    1  Bilateral impacted cerumen  -     Ear cerumen removal    2  Generalized anxiety disorder  Assessment & Plan:  Well resume Sertraline, recommended she take it at bedtime  Advised f/u in 1 month with PCP for med check   Orders:  -     sertraline (ZOLOFT) 25 mg tablet; Take 1 tablet (25 mg total) by mouth daily  Ear cerumen removal    Date/Time: 2/25/2021 2:26 PM  Performed by: Danielle Richard  Authorized by: PARDEEP Duncan   Universal Protocol:  Consent: Verbal consent obtained  Consent given by: patient      Patient location:  Clinic  Procedure details:     Local anesthetic:  None    Location:  L ear and R ear    Procedure type: irrigation with instrumentation      Instrumentation: curette      Approach:  External  Post-procedure details:     Complication:  None    Hearing quality:  Normal    Patient tolerance of procedure: Tolerated well, no immediate complications  Comments:      Removal successful bilaterally              There are no Patient Instructions on file for this visit  Return in about 4 weeks (around 3/25/2021)  Subjective:      Patient ID: Dagmar Larsen is a 21 y o  female  Chief Complaint   Patient presents with    Ear Fullness     both-left worse--lj       Here today with complaints of her ears feeling clogged  She has been falling asleep with her AirPods in and uses them frequently throughout the day  She denies any ear pain or drainage  Notes occasional muffled hearing from her left ear  Would like to resume the Sertraline  She does not feel her anxiety is controlled  She felt that it made her tired, so she stopped taking it after 1 week  Reports she was previously taking it midday          The following portions of the patient's history were reviewed and updated as appropriate: allergies, current medications, past family history, past medical history, past social history, past surgical history and problem list     Review of Systems   Constitutional: Negative  HENT:        See HPI   Psychiatric/Behavioral:        See HPI         Current Outpatient Medications   Medication Sig Dispense Refill    B Complex Vitamins (B COMPLEX 1 PO) Take 1 capsule by mouth daily      Cholecalciferol (VITAMIN D) 2000 units CAPS Take by mouth daily      cyanocobalamin (VITAMIN B-12) 100 mcg tablet Take 100 mcg by mouth daily      Multiple Vitamins-Minerals (MULTIVITAMIN GUMMIES ADULT PO) Take 1 capsule by mouth daily      norethindrone (MICRONOR) 0 35 MG tablet Take 1 tablet (0 35 mg total) by mouth daily 90 tablet 1    sertraline (ZOLOFT) 25 mg tablet Take 1 tablet (25 mg total) by mouth daily 30 tablet 1     No current facility-administered medications for this visit  Objective:    BP 96/58   Pulse 84   Temp 97 6 °F (36 4 °C)   Resp 16   Ht 5' 6" (1 676 m)   Wt 63 5 kg (140 lb)   BMI 22 60 kg/m²        Physical Exam  Vitals signs and nursing note reviewed  Constitutional:       Appearance: She is well-developed  HENT:      Right Ear: There is impacted cerumen  Cardiovascular:      Rate and Rhythm: Normal rate and regular rhythm  Heart sounds: Normal heart sounds  No murmur  Pulmonary:      Effort: Pulmonary effort is normal       Breath sounds: Normal breath sounds  Skin:     General: Skin is warm and dry  Neurological:      Mental Status: She is alert     Psychiatric:         Mood and Affect: Mood normal          Behavior: Behavior normal                 PARDEEP Prasad

## 2021-03-10 ENCOUNTER — TELEPHONE (OUTPATIENT)
Dept: FAMILY MEDICINE CLINIC | Facility: CLINIC | Age: 21
End: 2021-03-10

## 2021-03-10 DIAGNOSIS — B34.9 VIRAL INFECTION, UNSPECIFIED: Primary | ICD-10-CM

## 2021-03-10 NOTE — TELEPHONE ENCOUNTER
Patient requesting COVID antibody testing  She is aware that they results are of questionable clinical significance but would like it anyway  Clerical - please put order in patient     Thank you,  Huy Wolfe, DO

## 2021-03-20 DIAGNOSIS — F41.1 GENERALIZED ANXIETY DISORDER: ICD-10-CM

## 2021-03-22 RX ORDER — SERTRALINE HYDROCHLORIDE 25 MG/1
TABLET, FILM COATED ORAL
Qty: 30 TABLET | Refills: 1 | Status: SHIPPED | OUTPATIENT
Start: 2021-03-22 | End: 2021-04-06

## 2021-03-25 ENCOUNTER — TELEPHONE (OUTPATIENT)
Dept: PSYCHIATRY | Facility: CLINIC | Age: 21
End: 2021-03-25

## 2021-03-25 NOTE — TELEPHONE ENCOUNTER
Fareed Olivia is looking to schedule an appointment for a Psychiatrist  Patient has Evolita and can be reached during the [place]  Fareed Olivia can be reached at [phone]  Chart is up to date

## 2021-04-05 DIAGNOSIS — F41.1 GENERALIZED ANXIETY DISORDER: ICD-10-CM

## 2021-04-06 RX ORDER — SERTRALINE HYDROCHLORIDE 25 MG/1
TABLET, FILM COATED ORAL
Qty: 90 TABLET | Refills: 1 | Status: SHIPPED | OUTPATIENT
Start: 2021-04-06 | End: 2021-05-18 | Stop reason: ALTCHOICE

## 2021-04-14 DIAGNOSIS — Z30.41 ORAL CONTRACEPTIVE PILL SURVEILLANCE: ICD-10-CM

## 2021-04-14 RX ORDER — ACETAMINOPHEN AND CODEINE PHOSPHATE 120; 12 MG/5ML; MG/5ML
1 SOLUTION ORAL DAILY
Qty: 90 TABLET | Refills: 1 | Status: SHIPPED | OUTPATIENT
Start: 2021-04-14 | End: 2022-02-04 | Stop reason: ALTCHOICE

## 2021-04-22 ENCOUNTER — TELEPHONE (OUTPATIENT)
Dept: FAMILY MEDICINE CLINIC | Facility: CLINIC | Age: 21
End: 2021-04-22

## 2021-04-22 DIAGNOSIS — F41.1 GENERALIZED ANXIETY DISORDER: ICD-10-CM

## 2021-04-22 DIAGNOSIS — E55.9 VITAMIN D DEFICIENCY: ICD-10-CM

## 2021-04-22 DIAGNOSIS — Z13.6 SCREENING FOR CARDIOVASCULAR CONDITION: Primary | ICD-10-CM

## 2021-04-22 NOTE — TELEPHONE ENCOUNTER
Spoke to patient, she said she wants Maribell to add this for her as she has seen her the last few times  She said she wants to know if she has any vitamin deficiencies and would like to get full labs done     Ervin Mcneil MA

## 2021-04-22 NOTE — TELEPHONE ENCOUNTER
Labs ordered  She was supposed to schedule a 1 month f/u med check with her PCP back in March  Please ask her to schedule and she can have labs done prior   Peg Romina

## 2021-05-03 ENCOUNTER — TELEPHONE (OUTPATIENT)
Dept: PSYCHIATRY | Facility: CLINIC | Age: 21
End: 2021-05-03

## 2021-05-03 NOTE — TELEPHONE ENCOUNTER
Shanna Will called and left a message to get schedule as a NP  I called yumi back and left a message  Informed her that she would need a referral to get an appointment   Left Intake's number to call once she had a referral

## 2021-05-04 ENCOUNTER — TELEPHONE (OUTPATIENT)
Dept: FAMILY MEDICINE CLINIC | Facility: CLINIC | Age: 21
End: 2021-05-04

## 2021-05-04 DIAGNOSIS — F41.1 GENERALIZED ANXIETY DISORDER: Primary | ICD-10-CM

## 2021-05-04 NOTE — TELEPHONE ENCOUNTER
Pt is requesting a referral for Dr Vin Arnett physiatrist  Víctor Vivar advise           [phone]- Edgardo Kim

## 2021-05-04 NOTE — TELEPHONE ENCOUNTER
Please call Woody Hau to clarify her request   What diagnosis would she like to see the specialist for?     Thank you,  Calista Wilder, DO

## 2021-05-07 ENCOUNTER — TELEPHONE (OUTPATIENT)
Dept: PSYCHIATRY | Facility: CLINIC | Age: 21
End: 2021-05-07

## 2021-05-13 ENCOUNTER — TELEPHONE (OUTPATIENT)
Dept: FAMILY MEDICINE CLINIC | Facility: CLINIC | Age: 21
End: 2021-05-13

## 2021-05-18 ENCOUNTER — OFFICE VISIT (OUTPATIENT)
Dept: FAMILY MEDICINE CLINIC | Facility: CLINIC | Age: 21
End: 2021-05-18
Payer: COMMERCIAL

## 2021-05-18 VITALS
TEMPERATURE: 96.3 F | DIASTOLIC BLOOD PRESSURE: 62 MMHG | HEART RATE: 80 BPM | SYSTOLIC BLOOD PRESSURE: 108 MMHG | HEIGHT: 66 IN | WEIGHT: 137 LBS | BODY MASS INDEX: 22.02 KG/M2 | RESPIRATION RATE: 16 BRPM

## 2021-05-18 DIAGNOSIS — F41.1 GENERALIZED ANXIETY DISORDER: Primary | ICD-10-CM

## 2021-05-18 PROCEDURE — 99213 OFFICE O/P EST LOW 20 MIN: CPT | Performed by: FAMILY MEDICINE

## 2021-05-18 PROCEDURE — 1036F TOBACCO NON-USER: CPT | Performed by: FAMILY MEDICINE

## 2021-05-18 PROCEDURE — 3008F BODY MASS INDEX DOCD: CPT | Performed by: FAMILY MEDICINE

## 2021-05-18 RX ORDER — PROPRANOLOL HYDROCHLORIDE 10 MG/1
10 TABLET ORAL 2 TIMES DAILY PRN
Qty: 30 TABLET | Refills: 1 | Status: SHIPPED | OUTPATIENT
Start: 2021-05-18 | End: 2021-05-28

## 2021-05-18 RX ORDER — QUETIAPINE FUMARATE 50 MG/1
50 TABLET, FILM COATED ORAL
Qty: 30 TABLET | Refills: 3 | Status: SHIPPED | OUTPATIENT
Start: 2021-05-18 | End: 2021-06-30 | Stop reason: ALTCHOICE

## 2021-05-18 NOTE — ASSESSMENT & PLAN NOTE
Failed lexapro and sertraline  I am concerned that she may have some episodes of hypomania  Will start patient on Seroquel  Risks and benefits of medication discussed    She is also going to follow-up with behavior Health for further evaluation

## 2021-05-18 NOTE — PROGRESS NOTES
Assessment/Plan:    1  Generalized anxiety disorder  Assessment & Plan:  Failed lexapro and sertraline  I am concerned that she may have some episodes of hypomania  Will start patient on Seroquel  Risks and benefits of medication discussed  She is also going to follow-up with behavior Health for further evaluation    Orders:  -     Ambulatory referral to Alexandra Soto; Future  -     QUEtiapine (SEROquel) 50 mg tablet; Take 1 tablet (50 mg total) by mouth daily at bedtime  -     propranolol (INDERAL) 10 mg tablet; Take 1 tablet (10 mg total) by mouth 2 (two) times a day as needed (anxiety)         There are no Patient Instructions on file for this visit  Return in about 1 month (around 6/18/2021) for Next scheduled follow up  Subjective:      Patient ID: Job Aviles is a 21 y o  female  Chief Complaint   Patient presents with    medication check     sas/cma       She felt bad on the sertraline so she stopped it  She has had worsening anxiety since stopping the medication  She is also having worsening shy bladder  She has a hard time urinating not at home  She is worried about people hearing her urinate  She denies having any manic episodes  She does not go on spending sprees  If she does have episodes where she will feel really good and then she will crash in feel sad        The following portions of the patient's history were reviewed and updated as appropriate:  past social history    Review of Systems      Current Outpatient Medications   Medication Sig Dispense Refill    B Complex Vitamins (B COMPLEX 1 PO) Take 1 capsule by mouth daily      Cholecalciferol (VITAMIN D) 2000 units CAPS Take by mouth daily      Multiple Vitamins-Minerals (MULTIVITAMIN GUMMIES ADULT PO) Take 1 capsule by mouth daily      norethindrone (MICRONOR) 0 35 MG tablet Take 1 tablet (0 35 mg total) by mouth daily 90 tablet 1    cyanocobalamin (VITAMIN B-12) 100 mcg tablet Take 100 mcg by mouth daily      propranolol (INDERAL) 10 mg tablet Take 1 tablet (10 mg total) by mouth 2 (two) times a day as needed (anxiety) 30 tablet 1    QUEtiapine (SEROquel) 50 mg tablet Take 1 tablet (50 mg total) by mouth daily at bedtime 30 tablet 3     No current facility-administered medications for this visit  Objective:    /62   Pulse 80   Temp (!) 96 3 °F (35 7 °C)   Resp 16   Ht 5' 6" (1 676 m)   Wt 62 1 kg (137 lb)   LMP 05/16/2021 (Exact Date)   BMI 22 11 kg/m²      Physical Exam  Vitals signs and nursing note reviewed  Constitutional:       Appearance: She is well-developed  HENT:      Head: Normocephalic and atraumatic  Right Ear: Tympanic membrane and external ear normal       Left Ear: Tympanic membrane and external ear normal    Cardiovascular:      Rate and Rhythm: Normal rate and regular rhythm  Heart sounds: Normal heart sounds  No murmur  No friction rub  Pulmonary:      Effort: Pulmonary effort is normal  No respiratory distress  Breath sounds: Normal breath sounds  No wheezing or rales  Musculoskeletal:      Right lower leg: No edema  Left lower leg: No edema               Calista Men, DO

## 2021-05-24 ENCOUNTER — IMMUNIZATIONS (OUTPATIENT)
Dept: FAMILY MEDICINE CLINIC | Facility: HOSPITAL | Age: 21
End: 2021-05-24

## 2021-05-24 DIAGNOSIS — Z23 ENCOUNTER FOR IMMUNIZATION: Primary | ICD-10-CM

## 2021-05-24 PROCEDURE — 91300 SARS-COV-2 / COVID-19 MRNA VACCINE (PFIZER-BIONTECH) 30 MCG: CPT

## 2021-05-24 PROCEDURE — 0001A SARS-COV-2 / COVID-19 MRNA VACCINE (PFIZER-BIONTECH) 30 MCG: CPT

## 2021-05-27 DIAGNOSIS — F41.1 GENERALIZED ANXIETY DISORDER: ICD-10-CM

## 2021-05-28 RX ORDER — PROPRANOLOL HYDROCHLORIDE 10 MG/1
10 TABLET ORAL 2 TIMES DAILY PRN
Qty: 180 TABLET | Refills: 1 | Status: SHIPPED | OUTPATIENT
Start: 2021-05-28 | End: 2021-06-30 | Stop reason: ALTCHOICE

## 2021-06-16 ENCOUNTER — IMMUNIZATIONS (OUTPATIENT)
Dept: FAMILY MEDICINE CLINIC | Facility: HOSPITAL | Age: 21
End: 2021-06-16

## 2021-06-16 DIAGNOSIS — Z23 ENCOUNTER FOR IMMUNIZATION: Primary | ICD-10-CM

## 2021-06-16 PROCEDURE — 91300 SARS-COV-2 / COVID-19 MRNA VACCINE (PFIZER-BIONTECH) 30 MCG: CPT

## 2021-06-16 PROCEDURE — 0002A SARS-COV-2 / COVID-19 MRNA VACCINE (PFIZER-BIONTECH) 30 MCG: CPT

## 2021-06-22 ENCOUNTER — TELEPHONE (OUTPATIENT)
Dept: FAMILY MEDICINE CLINIC | Facility: CLINIC | Age: 21
End: 2021-06-22

## 2021-06-30 ENCOUNTER — OFFICE VISIT (OUTPATIENT)
Dept: FAMILY MEDICINE CLINIC | Facility: CLINIC | Age: 21
End: 2021-06-30
Payer: COMMERCIAL

## 2021-06-30 VITALS
HEART RATE: 72 BPM | RESPIRATION RATE: 16 BRPM | BODY MASS INDEX: 22.02 KG/M2 | DIASTOLIC BLOOD PRESSURE: 64 MMHG | SYSTOLIC BLOOD PRESSURE: 106 MMHG | HEIGHT: 66 IN | TEMPERATURE: 97.8 F | WEIGHT: 137 LBS

## 2021-06-30 DIAGNOSIS — F41.1 GENERALIZED ANXIETY DISORDER: Primary | ICD-10-CM

## 2021-06-30 PROCEDURE — 1036F TOBACCO NON-USER: CPT | Performed by: FAMILY MEDICINE

## 2021-06-30 PROCEDURE — 3008F BODY MASS INDEX DOCD: CPT | Performed by: FAMILY MEDICINE

## 2021-06-30 PROCEDURE — 99213 OFFICE O/P EST LOW 20 MIN: CPT | Performed by: FAMILY MEDICINE

## 2021-06-30 RX ORDER — CITALOPRAM 10 MG/1
10 TABLET ORAL DAILY
Qty: 30 TABLET | Refills: 3 | Status: SHIPPED | OUTPATIENT
Start: 2021-06-30 | End: 2021-08-03 | Stop reason: SDUPTHER

## 2021-06-30 NOTE — PROGRESS NOTES
Assessment/Plan:    1  Generalized anxiety disorder  Assessment & Plan:  Not controlled  Starting [employer] in August   Will start low dose citalopram  Will follow up with psychiatrist once she starts school  We discussed that if she does have bipolar than citalopram may make her hypomania worse  If she gets any worsening symptoms in that regard she is to stop the medication immediately  Orders:  -     citalopram (CeleXA) 10 mg tablet; Take 1 tablet (10 mg total) by mouth daily          There are no Patient Instructions on file for this visit  Return for Annual physical over her winter break   Subjective:      Patient ID: Asael Middleton is a 24 y o  female  Chief Complaint   Patient presents with    Follow-up     [facility]       She has an appointment with a therapist tomorrow  She is afraid of the side effect from seroquel so she didn't take  She stopped the lexapro because she felt weird  Her mood has been unstable  She does think she gets hypomania  She will have weeks where she feels really good and exercises and eats healthy        The following portions of the patient's history were reviewed and updated as appropriate: allergies, current medications, past family history, past medical history, past social history, past surgical history and problem list     Review of Systems      Current Outpatient Medications   Medication Sig Dispense Refill    B Complex Vitamins (B COMPLEX 1 PO) Take 1 capsule by mouth daily      Cholecalciferol (VITAMIN D) 2000 units CAPS Take by mouth daily      cyanocobalamin (VITAMIN B-12) 100 mcg tablet Take 100 mcg by mouth daily      Multiple Vitamins-Minerals (MULTIVITAMIN GUMMIES ADULT PO) Take 1 capsule by mouth daily      norethindrone (MICRONOR) 0 35 MG tablet Take 1 tablet (0 35 mg total) by mouth daily 90 tablet 1    citalopram (CeleXA) 10 mg tablet Take 1 tablet (10 mg total) by mouth daily 30 tablet 3     No current facility-administered medications for this visit  Objective:    /64   Pulse 72   Temp 97 8 °F (36 6 °C)   Resp 16   Ht 5' 6" (1 676 m)   Wt 62 1 kg (137 lb)   LMP 06/28/2021 (Exact Date)   BMI 22 11 kg/m²        Physical Exam  Vitals and nursing note reviewed  Constitutional:       Appearance: She is well-developed  HENT:      Head: Normocephalic and atraumatic  Right Ear: Tympanic membrane and external ear normal       Left Ear: Tympanic membrane and external ear normal    Cardiovascular:      Rate and Rhythm: Normal rate and regular rhythm  Heart sounds: Normal heart sounds  No murmur heard  No friction rub  Pulmonary:      Effort: Pulmonary effort is normal  No respiratory distress  Breath sounds: Normal breath sounds  No wheezing or rales  Musculoskeletal:      Right lower leg: No edema  Left lower leg: No edema                  Vijaya Kenyon DO

## 2021-06-30 NOTE — LETTER
June 30, 2021     Patient: Josie Silverman   YOB: 2000   Date of Visit: 6/30/2021       To Whom it May Concern:    Josie Parent is under my professional care  Please excuse from work on 6/17/21 due to side effects of COVID 19 vaccination  If you have any questions or concerns, please don't hesitate to call           Sincerely,          Milind Rios DO        CC: No Recipients

## 2021-06-30 NOTE — ASSESSMENT & PLAN NOTE
Not controlled  Starting [employer] in August   Will start low dose citalopram  Will follow up with psychiatrist once she starts school  We discussed that if she does have bipolar than citalopram may make her hypomania worse  If she gets any worsening symptoms in that regard she is to stop the medication immediately

## 2021-07-12 ENCOUNTER — TELEPHONE (OUTPATIENT)
Dept: FAMILY MEDICINE CLINIC | Facility: CLINIC | Age: 21
End: 2021-07-12

## 2021-07-12 NOTE — TELEPHONE ENCOUNTER
Patient scheduled for tomorrow for 3:15   Please order Adacel  Carolyn Garsia MA  Forms in nurse pending 1

## 2021-07-12 NOTE — TELEPHONE ENCOUNTER
Form reviewed, she needs to get an Adacel   Clinical - please fill out immunization dates on form and then I can sign it      Form in nurse pending  Thank you,  Gino Campbell, DO

## 2021-07-12 NOTE — TELEPHONE ENCOUNTER
Placed back in your folder   There are some spots left blank because I dont have all the information on the vaccine in question (ex  Hep B 2nd page) I also attached the vaccine record and stamped the form    Oliver Meléndez MA

## 2021-07-12 NOTE — TELEPHONE ENCOUNTER
Merced Santamaria dropped off forms to be filled out to be filled out for college  Please call when ready to be picked up  Forms place in nurse pending #2    Min James MA

## 2021-07-13 ENCOUNTER — CLINICAL SUPPORT (OUTPATIENT)
Dept: FAMILY MEDICINE CLINIC | Facility: CLINIC | Age: 21
End: 2021-07-13
Payer: COMMERCIAL

## 2021-07-13 DIAGNOSIS — Z23 NEED FOR VACCINATION: Primary | ICD-10-CM

## 2021-07-13 PROCEDURE — 90715 TDAP VACCINE 7 YRS/> IM: CPT

## 2021-07-13 PROCEDURE — 90471 IMMUNIZATION ADMIN: CPT

## 2021-08-03 ENCOUNTER — TELEPHONE (OUTPATIENT)
Dept: FAMILY MEDICINE CLINIC | Facility: CLINIC | Age: 21
End: 2021-08-03

## 2021-08-03 DIAGNOSIS — F41.1 GENERALIZED ANXIETY DISORDER: ICD-10-CM

## 2021-08-03 RX ORDER — CITALOPRAM 20 MG/1
20 TABLET ORAL DAILY
Qty: 30 TABLET | Refills: 3 | Status: SHIPPED | OUTPATIENT
Start: 2021-08-03 | End: 2021-09-03

## 2021-08-03 NOTE — TELEPHONE ENCOUNTER
Currently on 10 mg Citalopram     Anxiety slightly better  Still having panic attacks  Can she up the dosage of medication? Please call patient back      Thank You

## 2021-08-03 NOTE — TELEPHONE ENCOUNTER
Yes, please have the patient increase her dose to 20 mg daily  New prescription sent to pharmacy    Thank you,  Shaq Gutierrez, DO

## 2021-08-03 NOTE — TELEPHONE ENCOUNTER
Spoke with pt, she is aware dosage was increased to 20 mg daily and a new script was sent over to the pharmacy   Tammie Cook LPN

## 2021-08-04 ENCOUNTER — TELEPHONE (OUTPATIENT)
Dept: FAMILY MEDICINE CLINIC | Facility: CLINIC | Age: 21
End: 2021-08-04

## 2021-09-02 DIAGNOSIS — F41.1 GENERALIZED ANXIETY DISORDER: ICD-10-CM

## 2021-09-03 RX ORDER — CITALOPRAM 20 MG/1
TABLET ORAL
Qty: 90 TABLET | Refills: 1 | Status: SHIPPED | OUTPATIENT
Start: 2021-09-03 | End: 2022-01-14 | Stop reason: SDUPTHER

## 2022-01-14 DIAGNOSIS — F41.1 GENERALIZED ANXIETY DISORDER: ICD-10-CM

## 2022-01-17 RX ORDER — CITALOPRAM 20 MG/1
20 TABLET ORAL DAILY
Qty: 90 TABLET | Refills: 0 | Status: SHIPPED | OUTPATIENT
Start: 2022-01-17 | End: 2022-05-03

## 2022-02-04 ENCOUNTER — OFFICE VISIT (OUTPATIENT)
Dept: FAMILY MEDICINE CLINIC | Facility: CLINIC | Age: 22
End: 2022-02-04
Payer: COMMERCIAL

## 2022-02-04 VITALS
SYSTOLIC BLOOD PRESSURE: 110 MMHG | HEIGHT: 66 IN | HEART RATE: 88 BPM | WEIGHT: 144 LBS | DIASTOLIC BLOOD PRESSURE: 60 MMHG | BODY MASS INDEX: 23.14 KG/M2 | RESPIRATION RATE: 16 BRPM | TEMPERATURE: 98 F

## 2022-02-04 DIAGNOSIS — Z00.00 ANNUAL PHYSICAL EXAM: Primary | ICD-10-CM

## 2022-02-04 DIAGNOSIS — Z11.4 SCREENING FOR HIV (HUMAN IMMUNODEFICIENCY VIRUS): ICD-10-CM

## 2022-02-04 DIAGNOSIS — Z11.3 SCREEN FOR STD (SEXUALLY TRANSMITTED DISEASE): ICD-10-CM

## 2022-02-04 DIAGNOSIS — Z11.3 SCREENING FOR GONORRHEA: ICD-10-CM

## 2022-02-04 DIAGNOSIS — F90.2 ATTENTION DEFICIT HYPERACTIVITY DISORDER (ADHD), COMBINED TYPE: ICD-10-CM

## 2022-02-04 DIAGNOSIS — Z11.8 SCREENING FOR CHLAMYDIAL DISEASE: ICD-10-CM

## 2022-02-04 DIAGNOSIS — Z11.3 SCREENING FOR STDS (SEXUALLY TRANSMITTED DISEASES): ICD-10-CM

## 2022-02-04 PROCEDURE — 1036F TOBACCO NON-USER: CPT | Performed by: FAMILY MEDICINE

## 2022-02-04 PROCEDURE — 99395 PREV VISIT EST AGE 18-39: CPT | Performed by: FAMILY MEDICINE

## 2022-02-04 PROCEDURE — 3008F BODY MASS INDEX DOCD: CPT | Performed by: FAMILY MEDICINE

## 2022-02-04 RX ORDER — DEXTROAMPHETAMINE SACCHARATE, AMPHETAMINE ASPARTATE MONOHYDRATE, DEXTROAMPHETAMINE SULFATE AND AMPHETAMINE SULFATE 2.5; 2.5; 2.5; 2.5 MG/1; MG/1; MG/1; MG/1
10 CAPSULE, EXTENDED RELEASE ORAL EVERY MORNING
Qty: 30 CAPSULE | Refills: 0 | Status: SHIPPED | OUTPATIENT
Start: 2022-02-04 | End: 2022-05-02 | Stop reason: ALTCHOICE

## 2022-02-04 NOTE — ASSESSMENT & PLAN NOTE
Currently using condoms, aware not safe to get pregnant with current medication  Risks and benefits of medication discussed at length  [place] reviewed and is appropriate

## 2022-02-04 NOTE — PROGRESS NOTES
FAMILY PRACTICE HEALTH MAINTENANCE OFFICE VISIT  [facility]    NAME: Leobardo Canela  AGE: 24 y o  SEX: female  : 2000     DATE: 2022    Assessment and Plan     1  Annual physical exam    2  Attention deficit hyperactivity disorder (ADHD), combined type  Assessment & Plan:  Currently using condoms, aware not safe to get pregnant with current medication  Risks and benefits of medication discussed at length  [place] reviewed and is appropriate  Orders:  -     amphetamine-dextroamphetamine (ADDERALL XR, 10MG,) 10 MG 24 hr capsule; Take 1 capsule (10 mg total) by mouth every morning Max Daily Amount: 10 mg    3  Screening for STDs (sexually transmitted diseases)    4  Screening for HIV (human immunodeficiency virus)  -     [facility]- Human Immunodeficiency Virus 1/2 Antigen / Antibody ( Fourth Generation) with Reflex Testing; Future    5  Screening for chlamydial disease  -     Chlamydia/GC amplified DNA by PCR; Future  -     Chlamydia/GC amplified DNA by PCR    6  Screening for gonorrhea  -     Chlamydia/GC amplified DNA by PCR; Future  -     Chlamydia/GC amplified DNA by PCR    7  Screen for STD (sexually transmitted disease)  -     RPR; Future  -     RPR      · Patient Counseling:   · Nutrition: Stressed importance of a well balanced diet, moderation of sodium/saturated fat, caloric balance and sufficient intake of fiber  · Exercise: Stressed the importance of regular exercise with a goal of 150 minutes per week  · Dental Health: Discussed daily flossing and brushing and regular dental visits     · Immunizations reviewed: COVID booster recommended   · Discussed benefits of:  reminded to follow up with her gyn  BMI Counseling: Body mass index is 23 24 kg/m²  Discussed with patient's BMI with her  Return in about 1 month (around 3/4/2022) for Next scheduled follow up          Chief Complaint     Chief Complaint   Patient presents with    Physical Exam     ac/lpn    Discuss medication     therapist thinks she should be on ADHD meds       History of Present Illness     She has been working with her therapist and her therapist tested her for ADHD  Her dad doesn't think ADHD is a thing so she never was evaluated for it  She has had issues since she was little  She has a hard time focusing in class  She is hyperactive  She is stimulated herself sometimes  She has a hard time focusing at home as well  She is using condoms to prevent pregnancy  She would like STD screening due to concerns of risk of transmission during oral sex  Well Adult Physical   Patient here for a comprehensive physical exam       Diet and Physical Activity  Diet: well balanced diet and pescatarian  Exercise: frequently      Depression Screen  PHQ-2/9 Depression Screening    Little interest or pleasure in doing things: 0 - not at all  Feeling down, depressed, or hopeless: 1 - several days  PHQ-2 Score: 1  PHQ-2 Interpretation: Negative depression screen          General Health  Hearing: Normal:  bilateral  Vision: no vision problems  Dental: regular dental visits    Reproductive Health  Regular Periods      The following portions of the patient's history were reviewed and updated as appropriate: allergies, current medications, past family history, past medical history, past social history, past surgical history and problem list     Review of Systems     Review of Systems   Constitutional: Negative  Respiratory: Negative  Cardiovascular: Negative          Past Medical History     Past Medical History:   Diagnosis Date    Complication of left ear piercing     last assessed: 09/11/17    Exposure to blood or body fluid     last assessed: 01/31/17    Moderate major depression ([address] )     last assessed: 07/25/17       Past Surgical History     Past Surgical History:   Procedure Laterality Date    FRACTURE SURGERY Left     femur       Social History     Social History     Socioeconomic History    Marital status: Single     Spouse name: None    Number of children: None    Years of education: None    Highest education level: None   Occupational History    None   Tobacco Use    Smoking status: Never Smoker    Smokeless tobacco: Never Used   Substance and Sexual Activity    Alcohol use: Never    Drug use: Never    Sexual activity: Yes     Partners: Male     Comment: pt declines hiv std testing   Other Topics Concern    None   Social History Narrative    None     Social Determinants of Health     Financial Resource Strain: Not on file   Food Insecurity: Not on file   Transportation Needs: Not on file   Physical Activity: Not on file   Stress: Not on file   Social Connections: Not on file   Intimate Partner Violence: Not on file   Housing Stability: Not on file       Family History     Family History   Problem Relation Age of Onset    Other Mother         Seizures    Seasonal affective disorder Mother     Gout Father     Hypertension Father        Current Medications       Current Outpatient Medications:     B Complex Vitamins (B COMPLEX 1 PO), Take 1 capsule by mouth daily, Disp: , Rfl:     Cholecalciferol (VITAMIN D) 2000 units CAPS, Take by mouth daily, Disp: , Rfl:     citalopram (CeleXA) 20 mg tablet, Take 1 tablet (20 mg total) by mouth daily, Disp: 90 tablet, Rfl: 0    cyanocobalamin (VITAMIN B-12) 100 mcg tablet, Take 100 mcg by mouth daily, Disp: , Rfl:     Multiple Vitamins-Minerals (MULTIVITAMIN GUMMIES ADULT PO), Take 1 capsule by mouth daily, Disp: , Rfl:     amphetamine-dextroamphetamine (ADDERALL XR, 10MG,) 10 MG 24 hr capsule, Take 1 capsule (10 mg total) by mouth every morning Max Daily Amount: 10 mg, Disp: 30 capsule, Rfl: 0     Allergies     Allergies   Allergen Reactions    Clarithromycin        Objective     /60   Pulse 88   Temp 98 °F (36 7 °C)   Resp 16   Ht 5' 6" (1 676 m)   Wt 65 3 kg (144 lb)   BMI 23 24 kg/m²      Physical Exam  Vitals and nursing note reviewed  Constitutional:       Appearance: She is well-developed  HENT:      Head: Normocephalic and atraumatic  Right Ear: Tympanic membrane and external ear normal       Left Ear: Tympanic membrane and external ear normal    Cardiovascular:      Rate and Rhythm: Normal rate and regular rhythm  Heart sounds: Normal heart sounds  No murmur heard  No friction rub  Pulmonary:      Effort: Pulmonary effort is normal  No respiratory distress  Breath sounds: Normal breath sounds  No wheezing or rales  Abdominal:      General: There is no distension  Palpations: Abdomen is soft  There is no mass  Tenderness: There is no abdominal tenderness  There is no guarding or rebound  Musculoskeletal:      Cervical back: Normal range of motion  Right lower leg: No edema  Left lower leg: No edema  Skin:     General: Skin is warm  Neurological:      Mental Status: She is alert and oriented to person, place, and time     Psychiatric:         Mood and Affect: Mood normal             Visual Acuity Screening    Right eye Left eye Both eyes   Without correction:      With correction: 20/20 20/25 [address]

## 2022-03-01 LAB
C TRACH RRNA SPEC QL NAA+PROBE: NOT DETECTED
HIV 1+2 AB+HIV1 P24 AG SERPL QL IA: NORMAL
N GONORRHOEA RRNA SPEC QL NAA+PROBE: NOT DETECTED
RPR SER QL: NORMAL

## 2022-03-14 DIAGNOSIS — Z30.41 ORAL CONTRACEPTIVE PILL SURVEILLANCE: Primary | ICD-10-CM

## 2022-03-14 RX ORDER — ACETAMINOPHEN AND CODEINE PHOSPHATE 120; 12 MG/5ML; MG/5ML
1 SOLUTION ORAL DAILY
Qty: 90 TABLET | Refills: 3 | Status: SHIPPED | OUTPATIENT
Start: 2022-03-14 | End: 2022-05-02 | Stop reason: ALTCHOICE

## 2022-03-22 ENCOUNTER — TELEPHONE (OUTPATIENT)
Dept: FAMILY MEDICINE CLINIC | Facility: CLINIC | Age: 22
End: 2022-03-22

## 2022-04-22 DIAGNOSIS — F41.1 GENERALIZED ANXIETY DISORDER: Primary | ICD-10-CM

## 2022-05-02 ENCOUNTER — OFFICE VISIT (OUTPATIENT)
Dept: FAMILY MEDICINE CLINIC | Facility: CLINIC | Age: 22
End: 2022-05-02
Payer: COMMERCIAL

## 2022-05-02 ENCOUNTER — TELEPHONE (OUTPATIENT)
Dept: FAMILY MEDICINE CLINIC | Facility: CLINIC | Age: 22
End: 2022-05-02

## 2022-05-02 VITALS
WEIGHT: 144 LBS | HEIGHT: 66 IN | RESPIRATION RATE: 16 BRPM | BODY MASS INDEX: 23.14 KG/M2 | DIASTOLIC BLOOD PRESSURE: 60 MMHG | SYSTOLIC BLOOD PRESSURE: 102 MMHG | TEMPERATURE: 96.7 F | HEART RATE: 64 BPM | OXYGEN SATURATION: 98 %

## 2022-05-02 DIAGNOSIS — F90.2 ATTENTION DEFICIT HYPERACTIVITY DISORDER (ADHD), COMBINED TYPE: Primary | ICD-10-CM

## 2022-05-02 DIAGNOSIS — F90.2 ATTENTION DEFICIT HYPERACTIVITY DISORDER (ADHD), COMBINED TYPE: ICD-10-CM

## 2022-05-02 PROCEDURE — 1036F TOBACCO NON-USER: CPT | Performed by: FAMILY MEDICINE

## 2022-05-02 PROCEDURE — 99213 OFFICE O/P EST LOW 20 MIN: CPT | Performed by: FAMILY MEDICINE

## 2022-05-02 RX ORDER — DEXTROAMPHETAMINE SACCHARATE, AMPHETAMINE ASPARTATE, DEXTROAMPHETAMINE SULFATE AND AMPHETAMINE SULFATE 5; 5; 5; 5 MG/1; MG/1; MG/1; MG/1
20 TABLET ORAL
Qty: 60 TABLET | Refills: 0 | Status: SHIPPED | OUTPATIENT
Start: 2022-05-02

## 2022-05-02 RX ORDER — DEXTROAMPHETAMINE SACCHARATE, AMPHETAMINE ASPARTATE, DEXTROAMPHETAMINE SULFATE AND AMPHETAMINE SULFATE 5; 5; 5; 5 MG/1; MG/1; MG/1; MG/1
20 TABLET ORAL
Qty: 60 TABLET | Refills: 0 | Status: SHIPPED | OUTPATIENT
Start: 2022-05-02 | End: 2022-05-02 | Stop reason: SDUPTHER

## 2022-05-02 NOTE — TELEPHONE ENCOUNTER
Pt was just seen, she states the [facility] here does not have her medication, Lindsay  Pls cancel this and send to [facility] near her school [address] [place] [address], [facility], [address] [place] [address],[address] [phone]  Requested a call to let her know it was done

## 2022-05-02 NOTE — TELEPHONE ENCOUNTER
New prescription sent to her pharmacy by school  Please let her know it was done  Thank you,  Marleen Mares, DO

## 2022-05-02 NOTE — PROGRESS NOTES
Assessment/Plan:    1  Attention deficit hyperactivity disorder (ADHD), combined type  Assessment & Plan:  Not controlled with extended release version  Will try immediate release  [place] reviewed and is appropriate  Currently not sexually active  Aware not to get pregnant on this medication        Orders:  -     amphetamine-dextroamphetamine (ADDERALL, 20MG,) 20 mg tablet; Take 1 tablet (20 mg total) by mouth 2 (two) times a day Max Daily Amount: 40 mg           There are no Patient Instructions on file for this visit  Return in about 6 months (around 11/2/2022) for Next scheduled follow up  Subjective:      Patient ID: Hiram Byrd is a 24 y o  female  Chief Complaint   Patient presents with    Follow-up     refill meds     mfcma       She has been taking 20 mg of the Adderal XR  When it is at its peak it works well  It only helps for about 6 hours  The following portions of the patient's history were reviewed and updated as appropriate:  past social history    Review of Systems      Current Outpatient Medications   Medication Sig Dispense Refill    B Complex Vitamins (B COMPLEX 1 PO) Take 1 capsule by mouth daily      Cholecalciferol (VITAMIN D) 2000 units CAPS Take by mouth daily      citalopram (CeleXA) 20 mg tablet Take 1 tablet (20 mg total) by mouth daily 90 tablet 0    cyanocobalamin (VITAMIN B-12) 100 mcg tablet Take 100 mcg by mouth daily      Multiple Vitamins-Minerals (MULTIVITAMIN GUMMIES ADULT PO) Take 1 capsule by mouth daily      amphetamine-dextroamphetamine (ADDERALL, 20MG,) 20 mg tablet Take 1 tablet (20 mg total) by mouth 2 (two) times a day Max Daily Amount: 40 mg 60 tablet 0     No current facility-administered medications for this visit         Objective:    /60   Pulse 64   Temp (!) 96 7 °F (35 9 °C)   Resp 16   Ht 5' 6" (1 676 m)   Wt 65 3 kg (144 lb)   LMP 04/15/2022   SpO2 98%   Breastfeeding No   BMI 23 24 kg/m²      Physical Exam  Vitals and nursing note reviewed  Constitutional:       Appearance: She is well-developed  HENT:      Head: Normocephalic and atraumatic  Right Ear: Tympanic membrane and external ear normal       Left Ear: Tympanic membrane and external ear normal    Cardiovascular:      Rate and Rhythm: Normal rate and regular rhythm  Heart sounds: Normal heart sounds  No murmur heard  No friction rub  Pulmonary:      Effort: Pulmonary effort is normal  No respiratory distress  Breath sounds: Normal breath sounds  No wheezing or rales  Musculoskeletal:      Right lower leg: No edema  Left lower leg: No edema               Liliana Thorpe, DO

## 2022-05-02 NOTE — ASSESSMENT & PLAN NOTE
Not controlled with extended release version  Will try immediate release  [place] reviewed and is appropriate        Currently not sexually active  Aware not to get pregnant on this medication

## 2022-05-02 NOTE — TELEPHONE ENCOUNTER
Pt aware, nfa Writer informed of Dr. Pardo's recommendations. Mom states they have been adding rice cereal in pt's bottle 1 tbsp for 2 oz and that doesn't seem to be helping. Mom okay with Dr. Pardo sending in medication.    Writer explained that if insurance will not cover the medication she may have to call her insurance company to find out what they will cover. Mom verbalized understanding. Writer also explained she will need to monitor and if pt's symptoms worsen or do not get better to contact the office. No further questions at this time.

## 2022-05-03 DIAGNOSIS — F41.1 GENERALIZED ANXIETY DISORDER: ICD-10-CM

## 2022-05-03 RX ORDER — CITALOPRAM 20 MG/1
TABLET ORAL
Qty: 90 TABLET | Refills: 1 | Status: SHIPPED | OUTPATIENT
Start: 2022-05-03

## 2022-05-31 ENCOUNTER — OFFICE VISIT (OUTPATIENT)
Dept: FAMILY MEDICINE CLINIC | Facility: CLINIC | Age: 22
End: 2022-05-31
Payer: COMMERCIAL

## 2022-05-31 ENCOUNTER — TELEPHONE (OUTPATIENT)
Dept: PSYCHIATRY | Facility: CLINIC | Age: 22
End: 2022-05-31

## 2022-05-31 VITALS
DIASTOLIC BLOOD PRESSURE: 60 MMHG | BODY MASS INDEX: 22.82 KG/M2 | OXYGEN SATURATION: 98 % | SYSTOLIC BLOOD PRESSURE: 96 MMHG | TEMPERATURE: 97.6 F | HEIGHT: 66 IN | WEIGHT: 142 LBS | RESPIRATION RATE: 16 BRPM | HEART RATE: 88 BPM

## 2022-05-31 DIAGNOSIS — F41.1 GENERALIZED ANXIETY DISORDER: ICD-10-CM

## 2022-05-31 DIAGNOSIS — G89.29 CHRONIC BILATERAL LOW BACK PAIN WITH BILATERAL SCIATICA: Primary | ICD-10-CM

## 2022-05-31 DIAGNOSIS — Z11.3 SCREEN FOR STD (SEXUALLY TRANSMITTED DISEASE): ICD-10-CM

## 2022-05-31 DIAGNOSIS — M54.42 CHRONIC BILATERAL LOW BACK PAIN WITH BILATERAL SCIATICA: Primary | ICD-10-CM

## 2022-05-31 DIAGNOSIS — M54.41 CHRONIC BILATERAL LOW BACK PAIN WITH BILATERAL SCIATICA: Primary | ICD-10-CM

## 2022-05-31 PROCEDURE — 99214 OFFICE O/P EST MOD 30 MIN: CPT | Performed by: FAMILY MEDICINE

## 2022-05-31 PROCEDURE — 3008F BODY MASS INDEX DOCD: CPT | Performed by: FAMILY MEDICINE

## 2022-05-31 RX ORDER — MELOXICAM 15 MG/1
15 TABLET ORAL DAILY PRN
Qty: 90 TABLET | Refills: 0 | Status: SHIPPED | OUTPATIENT
Start: 2022-05-31 | End: 2022-08-29

## 2022-05-31 NOTE — PROGRESS NOTES
Assessment/Plan:    No problem-specific Assessment & Plan notes found for this encounter  Low back pain r/o Sacroiliitis  XR  Short course nsaids, caution with SSRI  Consider PT for 6w then possible MRI if no better  SLR neg    LASHAY stable    STD screening and safe sex advised, denies any symptoms     Diagnoses and all orders for this visit:    Chronic bilateral low back pain with bilateral sciatica  -     meloxicam (MOBIC) 15 mg tablet; Take 1 tablet (15 mg total) by mouth daily as needed for mild pain or moderate pain  -     XR sacroiliac joints 3+ views; Future  -     Ambulatory Referral to [facility]; Future    Generalized anxiety disorder    Screen for STD (sexually transmitted disease)  -     Human Immunodeficiency Virus 1/2 Antigen / Antibody ( Fourth Generation) with Reflex Testing; Future  -     RPR, Rfx Qn RPR/Confirm TP; Future  -     Chlamydia/GC amplified DNA by PCR; Future        Return if symptoms worsen or fail to improve  Subjective:      Patient ID: Mey Trevizo is a 24 y o  female  Chief Complaint   Patient presents with    Back Pain     Times couple months            sas/cma       HPI  Bio major, senior, [employer]    Few months  Goes to gym, lifts weights  Pushes self, dead lifting  Sharp pains at times, radiates down legs alternately at times  Few times a week, not even at gym  While sitting  Down to knee but not below  Can last seconds to minutes  No falls  Not while walking  Has stopped dead lifting for past few weeks and still happening  Can occur with barbell squats now and then  No nsaids tried  On celexa    Starts summer class in 2w for 5w    Back from school 7/18/22 to late august     The following portions of the patient's history were reviewed and updated as appropriate: allergies, current medications, past family history, past medical history, past social history, past surgical history and problem list     Review of Systems   Constitutional: Negative for fever  Respiratory: Negative for shortness of breath  Genitourinary: Negative for difficulty urinating  Musculoskeletal: Positive for back pain  Current Outpatient Medications   Medication Sig Dispense Refill    amphetamine-dextroamphetamine (ADDERALL, 20MG,) 20 mg tablet Take 1 tablet (20 mg total) by mouth 2 (two) times a day Max Daily Amount: 40 mg 60 tablet 0    B Complex Vitamins (B COMPLEX 1 PO) Take 1 capsule by mouth daily      Cholecalciferol (VITAMIN D) 2000 units CAPS Take by mouth daily      citalopram (CeleXA) 20 mg tablet TAKE 1 TABLET BY MOUTH EVERY DAY 90 tablet 1    cyanocobalamin (VITAMIN B-12) 100 mcg tablet Take 100 mcg by mouth daily      meloxicam (MOBIC) 15 mg tablet Take 1 tablet (15 mg total) by mouth daily as needed for mild pain or moderate pain 90 tablet 0    Multiple Vitamins-Minerals (MULTIVITAMIN GUMMIES ADULT PO) Take 1 capsule by mouth daily       No current facility-administered medications for this visit  Objective:    BP 96/60   Pulse 88   Temp 97 6 °F (36 4 °C)   Resp 16   Ht 5' 6" (1 676 m)   Wt 64 4 kg (142 lb)   LMP 05/07/2022 (Approximate)   SpO2 98%   BMI 22 92 kg/m²        Physical Exam  Vitals and nursing note reviewed  Constitutional:       General: She is not in acute distress  Appearance: She is well-developed  She is not ill-appearing  HENT:      Head: Normocephalic  Right Ear: Tympanic membrane normal       Left Ear: Tympanic membrane normal    Eyes:      Conjunctiva/sclera: Conjunctivae normal    Cardiovascular:      Rate and Rhythm: Normal rate and regular rhythm  Pulmonary:      Effort: Pulmonary effort is normal  No respiratory distress  Breath sounds: No wheezing or rales  Abdominal:      General: There is no distension  Palpations: Abdomen is soft  Tenderness: There is no abdominal tenderness  Musculoskeletal:         General: Tenderness present  No deformity  Cervical back: Neck supple  Right lower leg: No edema  Left lower leg: No edema  Comments: B/l SI joint tenderness  SLR neg b/l   Skin:     General: Skin is warm and dry  Neurological:      Mental Status: She is alert  Motor: No weakness  Deep Tendon Reflexes: Reflexes normal    Psychiatric:         Mood and Affect: Mood normal          Behavior: Behavior normal          Thought Content:  Thought content normal                 David Mason, DO

## 2022-08-29 DIAGNOSIS — M54.41 CHRONIC BILATERAL LOW BACK PAIN WITH BILATERAL SCIATICA: ICD-10-CM

## 2022-08-29 DIAGNOSIS — G89.29 CHRONIC BILATERAL LOW BACK PAIN WITH BILATERAL SCIATICA: ICD-10-CM

## 2022-08-29 DIAGNOSIS — M54.42 CHRONIC BILATERAL LOW BACK PAIN WITH BILATERAL SCIATICA: ICD-10-CM

## 2022-08-29 RX ORDER — MELOXICAM 15 MG/1
15 TABLET ORAL DAILY PRN
Qty: 90 TABLET | Refills: 0 | Status: SHIPPED | OUTPATIENT
Start: 2022-08-29 | End: 2022-11-27

## 2022-10-11 PROBLEM — Z11.3 SCREEN FOR STD (SEXUALLY TRANSMITTED DISEASE): Status: RESOLVED | Noted: 2022-05-31 | Resolved: 2022-10-11

## 2022-11-07 ENCOUNTER — TELEPHONE (OUTPATIENT)
Dept: PSYCHIATRY | Facility: CLINIC | Age: 22
End: 2022-11-07

## 2022-11-30 ENCOUNTER — OFFICE VISIT (OUTPATIENT)
Dept: FAMILY MEDICINE CLINIC | Facility: CLINIC | Age: 22
End: 2022-11-30

## 2022-11-30 VITALS
SYSTOLIC BLOOD PRESSURE: 100 MMHG | RESPIRATION RATE: 18 BRPM | TEMPERATURE: 97.3 F | WEIGHT: 143.6 LBS | BODY MASS INDEX: 23.08 KG/M2 | HEART RATE: 81 BPM | OXYGEN SATURATION: 99 % | HEIGHT: 66 IN | DIASTOLIC BLOOD PRESSURE: 60 MMHG

## 2022-11-30 DIAGNOSIS — S21.039A INFECTED PIERCED NIPPLE: Primary | ICD-10-CM

## 2022-11-30 DIAGNOSIS — N61.0 INFECTED PIERCED NIPPLE: Primary | ICD-10-CM

## 2022-11-30 RX ORDER — CEPHALEXIN 500 MG/1
500 CAPSULE ORAL EVERY 12 HOURS SCHEDULED
Qty: 14 CAPSULE | Refills: 0 | Status: SHIPPED | OUTPATIENT
Start: 2022-11-30 | End: 2022-12-07

## 2022-11-30 NOTE — PROGRESS NOTES
Assessment/Plan:    Advised warm compresses 3 times daily  Take antibiotics and apply topical as directed  Take piercings out once infection has cleared  F/u as needed    1  Infected pierced nipple  -     cephalexin (KEFLEX) 500 mg capsule; Take 1 capsule (500 mg total) by mouth every 12 (twelve) hours for 7 days  -     mupirocin (BACTROBAN) 2 % ointment; Apply topically 3 (three) times a day      Depression Screening and Follow-up Plan: Patient was screened for depression during today's encounter  They screened negative with a PHQ-2 score of 0  There are no Patient Instructions on file for this visit  Return if symptoms worsen or fail to improve  Subjective:      Patient ID: Silvana Goodwin is a 25 y o  female  Chief Complaint   Patient presents with   • nipple piercing infection     Bilateral nipple piercing infections  jmcma       Had nipples re-pierced 4 months ago  Had to take them out due to infection in the past   Has a pain deeper in her breasts  No fevers, chills, or body aches  Infection symptoms have been for the past month  Has been washing with antibacterial soap and sea salt  Has not used any antibiotic ointment  The following portions of the patient's history were reviewed and updated as appropriate: allergies, current medications, past family history, past medical history, past social history, past surgical history and problem list     Review of Systems   Constitutional: Negative for chills and fever  Skin:        See HPI   Hematological: Negative for adenopathy           Current Outpatient Medications   Medication Sig Dispense Refill   • amphetamine-dextroamphetamine (ADDERALL, 20MG,) 20 mg tablet Take 1 tablet (20 mg total) by mouth 2 (two) times a day Max Daily Amount: 40 mg 60 tablet 0   • cephalexin (KEFLEX) 500 mg capsule Take 1 capsule (500 mg total) by mouth every 12 (twelve) hours for 7 days 14 capsule 0   • citalopram (CeleXA) 20 mg tablet TAKE 1 TABLET BY MOUTH EVERY DAY 90 tablet 0   • mupirocin (BACTROBAN) 2 % ointment Apply topically 3 (three) times a day 22 g 0   • Cholecalciferol (VITAMIN D) 2000 units CAPS Take by mouth daily (Patient not taking: Reported on 11/30/2022)     • cyanocobalamin (VITAMIN B-12) 100 mcg tablet Take 100 mcg by mouth daily (Patient not taking: Reported on 11/30/2022)     • Multiple Vitamins-Minerals (MULTIVITAMIN GUMMIES ADULT PO) Take 1 capsule by mouth daily (Patient not taking: Reported on 11/30/2022)       No current facility-administered medications for this visit  Objective:    /60   Pulse 81   Temp (!) 97 3 °F (36 3 °C)   Resp 18   Ht 5' 6" (1 676 m)   Wt 65 1 kg (143 lb 9 6 oz)   LMP 10/14/2022 (Approximate)   SpO2 99%   BMI 23 18 kg/m²        Physical Exam  Vitals and nursing note reviewed  Constitutional:       Appearance: Normal appearance  She is well-developed and well-nourished  Pulmonary:      Effort: Pulmonary effort is normal    Chest:      Comments: Bilateral nipple piercings crusted  Nipples otherwise healthy in appearance  No active drainage, induration, erythema or warmth  No axillary lymphadenopathy   Skin:     General: Skin is warm and dry  Neurological:      Mental Status: She is alert     Psychiatric:         Mood and Affect: Mood and affect and mood normal          Behavior: Behavior normal                 PARDEEP Matson

## 2023-01-24 ENCOUNTER — OFFICE VISIT (OUTPATIENT)
Dept: FAMILY MEDICINE CLINIC | Facility: CLINIC | Age: 23
End: 2023-01-24

## 2023-01-24 VITALS
TEMPERATURE: 97.5 F | OXYGEN SATURATION: 99 % | HEART RATE: 86 BPM | SYSTOLIC BLOOD PRESSURE: 120 MMHG | WEIGHT: 160 LBS | DIASTOLIC BLOOD PRESSURE: 60 MMHG | RESPIRATION RATE: 16 BRPM | BODY MASS INDEX: 25.71 KG/M2 | HEIGHT: 66 IN

## 2023-01-24 DIAGNOSIS — F90.2 ATTENTION DEFICIT HYPERACTIVITY DISORDER (ADHD), COMBINED TYPE: Primary | ICD-10-CM

## 2023-01-24 DIAGNOSIS — L30.9 DERMATITIS: ICD-10-CM

## 2023-01-24 RX ORDER — DEXTROAMPHETAMINE SACCHARATE, AMPHETAMINE ASPARTATE, DEXTROAMPHETAMINE SULFATE AND AMPHETAMINE SULFATE 5; 5; 5; 5 MG/1; MG/1; MG/1; MG/1
20 TABLET ORAL
Qty: 60 TABLET | Refills: 0 | Status: SHIPPED | OUTPATIENT
Start: 2023-01-24

## 2023-01-24 RX ORDER — SULFAMETHOXAZOLE AND TRIMETHOPRIM 800; 160 MG/1; MG/1
1 TABLET ORAL EVERY 12 HOURS SCHEDULED
Qty: 14 TABLET | Refills: 0 | Status: SHIPPED | OUTPATIENT
Start: 2023-01-24 | End: 2023-01-31

## 2023-01-24 NOTE — PROGRESS NOTES
Name: Letty Cunningham      : 2000      MRN: [id number]  Encounter Provider: Galo Guaman DO  Encounter Date: 2023   Encounter department: [address] [place] [address]     1  Attention deficit hyperactivity disorder (ADHD), combined type  Assessment & Plan:  Stable  Aware she should not get pregnant on this medication    Orders:  -     amphetamine-dextroamphetamine (ADDERALL, 20MG,) 20 mg tablet; Take 1 tablet (20 mg total) by mouth 2 (two) times a day Max Daily Amount: 40 mg    2  Dermatitis  Comments:  benadryl recommended  to start bactrim if worsening  Orders:  -     sulfamethoxazole-trimethoprim (BACTRIM DS) 800-160 mg per tablet; Take 1 tablet by mouth every 12 (twelve) hours for 7 days        Return in about 6 months (around 2023) for Annual physical     [facility] prescription monitoring program report reviewed and is appropriate  Subjective      She had a new tattoo done in [place] at a convention on 23  Now the area is sore       Review of Systems    Current Outpatient Medications on File Prior to Visit   Medication Sig   • citalopram (CeleXA) 20 mg tablet TAKE 1 TABLET BY MOUTH EVERY DAY   • [DISCONTINUED] amphetamine-dextroamphetamine (ADDERALL, 20MG,) 20 mg tablet Take 1 tablet (20 mg total) by mouth 2 (two) times a day Max Daily Amount: 40 mg   • Cholecalciferol (VITAMIN D) 2000 units CAPS Take by mouth daily (Patient not taking: Reported on 2022)   • cyanocobalamin (VITAMIN B-12) 100 mcg tablet Take 100 mcg by mouth daily (Patient not taking: Reported on 2022)   • Multiple Vitamins-Minerals (MULTIVITAMIN GUMMIES ADULT PO) Take 1 capsule by mouth daily (Patient not taking: Reported on 2022)   • mupirocin (BACTROBAN) 2 % ointment Apply topically 3 (three) times a day (Patient not taking: Reported on 2023)       Objective     /60   Pulse 86   Temp 97 5 °F (36 4 °C) (Tympanic)   Resp 16   Ht 5' 6" (1 676 m)   Wt 72 6 kg (160 lb)   LMP 12/30/2022 (Approximate)   SpO2 99%   BMI 25 82 kg/m²     Physical Exam  Vitals and nursing note reviewed  Constitutional:       Appearance: She is well-developed  HENT:      Head: Normocephalic and atraumatic  Right Ear: Tympanic membrane and external ear normal       Left Ear: Tympanic membrane and external ear normal    Cardiovascular:      Rate and Rhythm: Normal rate and regular rhythm  Heart sounds: Normal heart sounds  No murmur heard  No friction rub  Pulmonary:      Effort: Pulmonary effort is normal  No respiratory distress  Breath sounds: Normal breath sounds  No wheezing or rales  Musculoskeletal:      Right lower leg: No edema  Left lower leg: No edema                David Patrick DO

## 2023-02-23 DIAGNOSIS — F41.1 GENERALIZED ANXIETY DISORDER: ICD-10-CM

## 2023-02-23 RX ORDER — CITALOPRAM 20 MG/1
TABLET ORAL
Qty: 90 TABLET | Refills: 1 | Status: SHIPPED | OUTPATIENT
Start: 2023-02-23

## 2023-03-20 ENCOUNTER — TELEPHONE (OUTPATIENT)
Dept: FAMILY MEDICINE CLINIC | Facility: CLINIC | Age: 23
End: 2023-03-20

## 2023-03-25 PROBLEM — R06.00 PND (PAROXYSMAL NOCTURNAL DYSPNEA): Status: ACTIVE | Noted: 2023-03-25

## 2023-03-25 RX ORDER — DOXYCYCLINE HYCLATE 100 MG/1
CAPSULE ORAL
COMMUNITY
Start: 2023-03-05

## 2023-03-25 RX ORDER — FLUTICASONE PROPIONATE 50 MCG
SPRAY, SUSPENSION (ML) NASAL
COMMUNITY
Start: 2023-03-05 | End: 2023-03-28 | Stop reason: SDUPTHER

## 2023-03-28 ENCOUNTER — OFFICE VISIT (OUTPATIENT)
Dept: FAMILY MEDICINE CLINIC | Facility: CLINIC | Age: 23
End: 2023-03-28

## 2023-03-28 VITALS
TEMPERATURE: 98.3 F | RESPIRATION RATE: 18 BRPM | DIASTOLIC BLOOD PRESSURE: 52 MMHG | BODY MASS INDEX: 24.53 KG/M2 | HEART RATE: 86 BPM | OXYGEN SATURATION: 98 % | HEIGHT: 66 IN | SYSTOLIC BLOOD PRESSURE: 98 MMHG | WEIGHT: 152.6 LBS

## 2023-03-28 DIAGNOSIS — F90.2 ATTENTION DEFICIT HYPERACTIVITY DISORDER (ADHD), COMBINED TYPE: ICD-10-CM

## 2023-03-28 DIAGNOSIS — J31.0 CHRONIC RHINITIS: ICD-10-CM

## 2023-03-28 DIAGNOSIS — R00.2 PALPITATIONS: ICD-10-CM

## 2023-03-28 DIAGNOSIS — F41.1 GENERALIZED ANXIETY DISORDER: ICD-10-CM

## 2023-03-28 DIAGNOSIS — R06.00 PND (PAROXYSMAL NOCTURNAL DYSPNEA): Primary | ICD-10-CM

## 2023-03-28 RX ORDER — FLUTICASONE PROPIONATE 50 MCG
2 SPRAY, SUSPENSION (ML) NASAL DAILY
Qty: 48 G | Refills: 1 | Status: SHIPPED | OUTPATIENT
Start: 2023-03-28

## 2023-03-28 NOTE — PROGRESS NOTES
Assessment/Plan:    No problem-specific Assessment & Plan notes found for this encounter  PND with palpitations  Offer WILSON testing  If no wilson, could suggest holter next   Cardiology referral given    LASHAY stable  ADHD unchanged    Rhinitis, suggest daily use flonase to assist upper airway symptoms     Diagnoses and all orders for this visit:    PND (paroxysmal nocturnal dyspnea)  -     Ambulatory referral to Sleep Medicine; Future    Generalized anxiety disorder    Attention deficit hyperactivity disorder (ADHD), combined type    Chronic rhinitis  -     fluticasone (FLONASE) 50 mcg/act nasal spray; 2 sprays into each nostril daily    Palpitations  -     Ambulatory referral to Cardiology; Future    Other orders  -     Discontinue: fluticasone (FLONASE) 50 mcg/act nasal spray  -     doxycycline hyclate (VIBRAMYCIN) 100 mg capsule;  (Patient not taking: Reported on 3/28/2023)        Return if symptoms worsen or fail to improve  Subjective:      Patient ID: Vin Witt is a 25 y o  female  Chief Complaint   Patient presents with   • Insomnia     Patient stated she wakes up in the middle of the night gasping for air and her heart is pounding  Patient stated it has been going on for a while  Donna Denney CMA    • Shortness of Breath       HPI  Sleep issues  PND sometimes and palpitations  Hard to fall back asleep  4-5x/night wakes   Daytime fatigue  Very long time  Snores  Lives at school at [facility]  No reported witnessed pauses by others  Has daytime drowsiness  Wt gain but intentional due to exercise  Uses flonase but not daily  No sleep aides taken    The following portions of the patient's history were reviewed and updated as appropriate: allergies, current medications, past family history, past medical history, past social history, past surgical history and problem list     Review of Systems   Constitutional: Negative for chills and fever  Respiratory: Positive for shortness of breath  "  Cardiovascular: Positive for palpitations  Current Outpatient Medications   Medication Sig Dispense Refill   • amphetamine-dextroamphetamine (ADDERALL, 20MG,) 20 mg tablet Take 1 tablet (20 mg total) by mouth 2 (two) times a day Max Daily Amount: 40 mg 60 tablet 0   • citalopram (CeleXA) 20 mg tablet TAKE 1 TABLET BY MOUTH EVERY DAY 90 tablet 1   • fluticasone (FLONASE) 50 mcg/act nasal spray 2 sprays into each nostril daily 48 g 1   • doxycycline hyclate (VIBRAMYCIN) 100 mg capsule  (Patient not taking: Reported on 3/28/2023)       No current facility-administered medications for this visit  Objective:    BP 98/52   Pulse 86   Temp 98 3 °F (36 8 °C)   Resp 18   Ht 5' 6\" (1 676 m)   Wt 69 2 kg (152 lb 9 6 oz)   LMP 03/14/2023 (Approximate)   SpO2 98%   BMI 24 63 kg/m²        Physical Exam  Vitals and nursing note reviewed  Constitutional:       General: She is not in acute distress  Appearance: She is well-developed  She is not ill-appearing  Comments: Very fit appearance from exercise   HENT:      Head: Normocephalic  Nose: Congestion present  Mouth/Throat:      Comments: No stenosis or tonsil enlargement  Eyes:      General: No scleral icterus  Conjunctiva/sclera: Conjunctivae normal    Cardiovascular:      Rate and Rhythm: Normal rate and regular rhythm  Heart sounds: No murmur heard  Pulmonary:      Effort: Pulmonary effort is normal  No respiratory distress  Breath sounds: No wheezing or rales  Abdominal:      Palpations: Abdomen is soft  Musculoskeletal:         General: No deformity  Cervical back: Neck supple  Right lower leg: No edema  Left lower leg: No edema  Skin:     General: Skin is warm and dry  Coloration: Skin is not pale  Neurological:      Mental Status: She is alert  Psychiatric:         Behavior: Behavior normal          Thought Content:  Thought content normal                 Cristian Streeter DO    "

## 2023-05-26 ENCOUNTER — TELEPHONE (OUTPATIENT)
Dept: OTHER | Facility: OTHER | Age: 23
End: 2023-05-26

## 2023-05-26 NOTE — TELEPHONE ENCOUNTER
Patient is calling regarding cancelling an appointment      Date/Time: 5/26/23 @ 8am    Patient was rescheduled: YES [] NO [x]    Patient requesting call back to reschedule: YES [] NO [x]

## 2023-06-26 DIAGNOSIS — F41.1 GENERALIZED ANXIETY DISORDER: ICD-10-CM

## 2023-06-26 RX ORDER — CITALOPRAM 40 MG/1
40 TABLET ORAL DAILY
Qty: 90 TABLET | Refills: 0 | Status: SHIPPED | OUTPATIENT
Start: 2023-06-26

## 2023-08-28 ENCOUNTER — OFFICE VISIT (OUTPATIENT)
Dept: FAMILY MEDICINE CLINIC | Facility: CLINIC | Age: 23
End: 2023-08-28
Payer: COMMERCIAL

## 2023-08-28 VITALS
SYSTOLIC BLOOD PRESSURE: 108 MMHG | TEMPERATURE: 97.6 F | DIASTOLIC BLOOD PRESSURE: 62 MMHG | BODY MASS INDEX: 24.27 KG/M2 | WEIGHT: 151 LBS | RESPIRATION RATE: 16 BRPM | HEART RATE: 87 BPM | HEIGHT: 66 IN

## 2023-08-28 DIAGNOSIS — Z30.41 ORAL CONTRACEPTIVE PILL SURVEILLANCE: Primary | ICD-10-CM

## 2023-08-28 PROCEDURE — 99213 OFFICE O/P EST LOW 20 MIN: CPT | Performed by: NURSE PRACTITIONER

## 2023-08-28 RX ORDER — ACETAMINOPHEN AND CODEINE PHOSPHATE 120; 12 MG/5ML; MG/5ML
1 SOLUTION ORAL DAILY
Qty: 90 TABLET | Refills: 3 | Status: SHIPPED | OUTPATIENT
Start: 2023-08-28

## 2023-08-28 NOTE — PROGRESS NOTES
Assessment/Plan:    She resume mini pill as she previously did well with this. Reviewed need for back up method of birth control when starting this for as at least 2 weeks, as her period is irregular. Discussed possibility of a benzo prior to pap if she desires. She will schedule when ready    1. Oral contraceptive pill surveillance  -     norethindrone (Ortho Micronor) 0.35 MG tablet; Take 1 tablet (0.35 mg total) by mouth daily            There are no Patient Instructions on file for this visit. Return if symptoms worsen or fail to improve. Subjective:      Patient ID: Dat Paz is a 21 y.o. female. Chief Complaint   Patient presents with   • Contraception     Wants to start bc again lw cma       Would like to restart OCPs  Was previously on the mini pill and did well with this. Has been in a steady relationship and would like to be able to not need condoms. Has not had a pap d/t vaginismus. She was wondering if it could be done under sedation        The following portions of the patient's history were reviewed and updated as appropriate: allergies, current medications, past family history, past medical history, past social history, past surgical history and problem list.    Review of Systems   Constitutional: Negative. Respiratory: Negative. Cardiovascular: Negative. Gastrointestinal: Negative.     Genitourinary:        See HPI         Current Outpatient Medications   Medication Sig Dispense Refill   • amphetamine-dextroamphetamine (ADDERALL, 20MG,) 20 mg tablet Take 1 tablet (20 mg total) by mouth 2 (two) times a day Max Daily Amount: 40 mg 60 tablet 0   • citalopram (CeleXA) 40 mg tablet Take 1 tablet (40 mg total) by mouth daily (Patient taking differently: Take 20 mg by mouth daily) 90 tablet 0   • fluticasone (FLONASE) 50 mcg/act nasal spray 2 sprays into each nostril daily 48 g 1   • norethindrone (Ortho Micronor) 0.35 MG tablet Take 1 tablet (0.35 mg total) by mouth daily 90 tablet 3     No current facility-administered medications for this visit. Objective:    /62   Pulse 87   Temp 97.6 °F (36.4 °C) (Tympanic)   Resp 16   Ht 5' 6" (1.676 m)   Wt 68.5 kg (151 lb)   LMP 07/15/2023 (Approximate)   BMI 24.37 kg/m²        Physical Exam  Vitals and nursing note reviewed. Constitutional:       Appearance: Normal appearance. She is well-developed. She is not ill-appearing or diaphoretic. HENT:      Head: Normocephalic and atraumatic. Eyes:      Conjunctiva/sclera: Conjunctivae normal.   Pulmonary:      Effort: Pulmonary effort is normal. No tachypnea, accessory muscle usage or respiratory distress. Musculoskeletal:      Cervical back: Normal range of motion. Skin:     Coloration: Skin is not pale. Neurological:      Mental Status: She is alert.    Psychiatric:         Mood and Affect: Mood normal.         Speech: Speech normal.         Behavior: Behavior normal.                PARDEEP Montero

## 2023-09-24 DIAGNOSIS — F41.1 GENERALIZED ANXIETY DISORDER: ICD-10-CM

## 2023-09-25 RX ORDER — CITALOPRAM 40 MG/1
40 TABLET ORAL DAILY
Qty: 90 TABLET | Refills: 0 | Status: SHIPPED | OUTPATIENT
Start: 2023-09-25

## 2023-10-04 DIAGNOSIS — F90.2 ATTENTION DEFICIT HYPERACTIVITY DISORDER (ADHD), COMBINED TYPE: ICD-10-CM

## 2023-10-04 RX ORDER — DEXTROAMPHETAMINE SACCHARATE, AMPHETAMINE ASPARTATE, DEXTROAMPHETAMINE SULFATE AND AMPHETAMINE SULFATE 5; 5; 5; 5 MG/1; MG/1; MG/1; MG/1
20 TABLET ORAL
Qty: 60 TABLET | Refills: 0 | Status: SHIPPED | OUTPATIENT
Start: 2023-10-04

## 2023-12-04 ENCOUNTER — OFFICE VISIT (OUTPATIENT)
Dept: FAMILY MEDICINE CLINIC | Facility: CLINIC | Age: 23
End: 2023-12-04
Payer: COMMERCIAL

## 2023-12-04 VITALS
BODY MASS INDEX: 24.11 KG/M2 | SYSTOLIC BLOOD PRESSURE: 122 MMHG | WEIGHT: 149.4 LBS | DIASTOLIC BLOOD PRESSURE: 68 MMHG | HEART RATE: 69 BPM | TEMPERATURE: 97.5 F | RESPIRATION RATE: 18 BRPM

## 2023-12-04 DIAGNOSIS — Z11.3 SCREENING FOR GONORRHEA: ICD-10-CM

## 2023-12-04 DIAGNOSIS — R10.2 PELVIC PAIN: Primary | ICD-10-CM

## 2023-12-04 DIAGNOSIS — Z11.3 ROUTINE SCREENING FOR STI (SEXUALLY TRANSMITTED INFECTION): ICD-10-CM

## 2023-12-04 DIAGNOSIS — N92.6 IRREGULAR MENSES: ICD-10-CM

## 2023-12-04 DIAGNOSIS — Z11.8 SCREENING FOR CHLAMYDIAL DISEASE: ICD-10-CM

## 2023-12-04 DIAGNOSIS — N94.6 DYSMENORRHEA: ICD-10-CM

## 2023-12-04 DIAGNOSIS — Z13.6 SCREENING FOR CARDIOVASCULAR CONDITION: ICD-10-CM

## 2023-12-04 DIAGNOSIS — Z13.0 SCREENING FOR DEFICIENCY ANEMIA: ICD-10-CM

## 2023-12-04 DIAGNOSIS — Z11.59 NEED FOR HEPATITIS C SCREENING TEST: ICD-10-CM

## 2023-12-04 DIAGNOSIS — Z11.4 SCREENING FOR HIV (HUMAN IMMUNODEFICIENCY VIRUS): ICD-10-CM

## 2023-12-04 PROCEDURE — 99214 OFFICE O/P EST MOD 30 MIN: CPT | Performed by: FAMILY MEDICINE

## 2023-12-04 NOTE — ASSESSMENT & PLAN NOTE
Not controlled with birth control  She is going to follow up with her gyn    I told her I would fill out FMLA forms if needed for her for work.  She is missing about 2 days per cycle

## 2023-12-04 NOTE — PROGRESS NOTES
Name: Rhonda Goins      : 2000      MRN: [id number]  Encounter Provider: Palak Arroyo DO  Encounter Date: 2023   Encounter department: [facility]     1. Pelvic pain  Comments:  new  Orders:  -     Ambulatory referral to Physical Therapy; Future  -     US pelvis complete w transvaginal; Future; Expected date: 2023    2. Screening for deficiency anemia  -     CBC; Future  -     CBC    3. Screening for cardiovascular condition  -     Comprehensive metabolic panel; Future  -     Lipid Panel with Direct LDL reflex; Future  -     Comprehensive metabolic panel  -     Lipid Panel with Direct LDL reflex    4. Dysmenorrhea  Assessment & Plan:  Not controlled with birth control  She is going to follow up with her gyn    I told her I would fill out FMLA forms if needed for her for work. She is missing about 2 days per cycle    Orders:  -     TSH, 3rd generation; Future  -     US pelvis complete w transvaginal; Future; Expected date: 2023  -     TSH, 3rd generation    5. Irregular menses  Comments:  new  Orders:  -     DHEA-sulfate; Future  -     DHEA-sulfate    6. Screening for HIV (human immunodeficiency virus)  -     HIV 1/2 AG/AB W REFLEX [facility] Only; Future    7. Screening for gonorrhea  -     Chlamydia/GC amplified DNA by PCR; Future  -     Chlamydia/GC amplified DNA by PCR    8. Screening for chlamydial disease  -     Chlamydia/GC amplified DNA by PCR; Future  -     Chlamydia/GC amplified DNA by PCR    9. Need for hepatitis C screening test  -     Hepatitis C antibody; Future  -     Hepatitis C antibody    10. Routine screening for STI (sexually transmitted infection)  -     RPR w/reflex to TrepSure; Future  -     RPR w/reflex to TrepSure     Return if symptoms worsen or fail to improve. Subjective      Her period pain is very severe and started with cramps a week before her period.   The pain is so bad for the first 2 days she is tearing and has a hard time working. Review of Systems    Current Outpatient Medications on File Prior to Visit   Medication Sig   • amphetamine-dextroamphetamine (ADDERALL, 20MG,) 20 mg tablet Take 1 tablet (20 mg total) by mouth 2 (two) times a day Max Daily Amount: 40 mg   • citalopram (CeleXA) 40 mg tablet TAKE 1 TABLET BY MOUTH EVERY DAY   • fluticasone (FLONASE) 50 mcg/act nasal spray 2 sprays into each nostril daily   • norethindrone (Ortho Micronor) 0.35 MG tablet Take 1 tablet (0.35 mg total) by mouth daily       Objective     /68   Pulse 69   Temp 97.5 °F (36.4 °C)   Resp 18   Wt 67.8 kg (149 lb 6.4 oz)   LMP 11/25/2023 (Approximate)   BMI 24.11 kg/m²     Physical Exam  Vitals and nursing note reviewed. Constitutional:       Appearance: She is well-developed. HENT:      Head: Normocephalic and atraumatic. Right Ear: Tympanic membrane and external ear normal.      Left Ear: Tympanic membrane and external ear normal.   Cardiovascular:      Rate and Rhythm: Normal rate and regular rhythm. Heart sounds: Normal heart sounds. No murmur heard. No friction rub. Pulmonary:      Effort: Pulmonary effort is normal. No respiratory distress. Breath sounds: Normal breath sounds. No wheezing or rales. Musculoskeletal:      Right lower leg: No edema. Left lower leg: No edema.        Rene Wetzel DO

## 2024-01-02 ENCOUNTER — OFFICE VISIT (OUTPATIENT)
Dept: FAMILY MEDICINE CLINIC | Facility: CLINIC | Age: 24
End: 2024-01-02
Payer: COMMERCIAL

## 2024-01-02 VITALS
RESPIRATION RATE: 18 BRPM | BODY MASS INDEX: 24.65 KG/M2 | HEART RATE: 84 BPM | DIASTOLIC BLOOD PRESSURE: 68 MMHG | TEMPERATURE: 99.3 F | WEIGHT: 153.4 LBS | HEIGHT: 66 IN | SYSTOLIC BLOOD PRESSURE: 126 MMHG

## 2024-01-02 DIAGNOSIS — J01.00 ACUTE NON-RECURRENT MAXILLARY SINUSITIS: Primary | ICD-10-CM

## 2024-01-02 PROCEDURE — 99213 OFFICE O/P EST LOW 20 MIN: CPT | Performed by: FAMILY MEDICINE

## 2024-01-02 RX ORDER — BENZONATATE 200 MG/1
200 CAPSULE ORAL 3 TIMES DAILY PRN
Qty: 30 CAPSULE | Refills: 0 | Status: SHIPPED | OUTPATIENT
Start: 2024-01-02

## 2024-01-02 RX ORDER — AMOXICILLIN AND CLAVULANATE POTASSIUM 875; 125 MG/1; MG/1
1 TABLET, FILM COATED ORAL EVERY 12 HOURS SCHEDULED
Qty: 20 TABLET | Refills: 0 | Status: SHIPPED | OUTPATIENT
Start: 2024-01-02 | End: 2024-01-12

## 2024-01-02 NOTE — PROGRESS NOTES
Assessment/Plan:    1. Acute non-recurrent maxillary sinusitis  -     benzonatate (TESSALON) 200 MG capsule; Take 1 capsule (200 mg total) by mouth 3 (three) times a day as needed for cough  -     amoxicillin-clavulanate (AUGMENTIN) 875-125 mg per tablet; Take 1 tablet by mouth every 12 (twelve) hours for 10 days            There are no Patient Instructions on file for this visit.    No follow-ups on file.    Subjective:      Patient ID: Nupur Coles is a 23 y.o. female.    Chief Complaint   Patient presents with   • Cough     Symptoms started over 2 weeks ago Ana See LPN     • Sore Throat       Pt states she has been sick for the last 2 weeks or so .  Originally wghat her botfriend had - she got that but much worse  Sore throat  Congestion  Cough          The following portions of the patient's history were reviewed and updated as appropriate: allergies, current medications, past family history, past medical history, past social history, past surgical history and problem list.    Review of Systems   HENT:  Positive for congestion and sinus pressure.    Respiratory:  Positive for cough.          Current Outpatient Medications   Medication Sig Dispense Refill   • amoxicillin-clavulanate (AUGMENTIN) 875-125 mg per tablet Take 1 tablet by mouth every 12 (twelve) hours for 10 days 20 tablet 0   • amphetamine-dextroamphetamine (ADDERALL, 20MG,) 20 mg tablet Take 1 tablet (20 mg total) by mouth 2 (two) times a day Max Daily Amount: 40 mg (Patient taking differently: Take 20 mg by mouth 2 (two) times a day As needed) 60 tablet 0   • benzonatate (TESSALON) 200 MG capsule Take 1 capsule (200 mg total) by mouth 3 (three) times a day as needed for cough 30 capsule 0   • citalopram (CeleXA) 40 mg tablet TAKE 1 TABLET BY MOUTH EVERY DAY 90 tablet 0   • fluticasone (FLONASE) 50 mcg/act nasal spray 2 sprays into each nostril daily 48 g 1   • norethindrone (Ortho Micronor) 0.35 MG tablet Take 1 tablet (0.35 mg total)  "by mouth daily 90 tablet 3     No current facility-administered medications for this visit.       Objective:    /68   Pulse 84   Temp 99.3 °F (37.4 °C)   Resp 18   Ht 5' 6\" (1.676 m)   Wt 69.6 kg (153 lb 6.4 oz)   LMP 12/30/2023 (Approximate)   BMI 24.76 kg/m²        Physical Exam  HENT:      Nose: Congestion and rhinorrhea present.                Frank Lombardi, DO  "

## 2024-03-29 DIAGNOSIS — F90.2 ATTENTION DEFICIT HYPERACTIVITY DISORDER (ADHD), COMBINED TYPE: ICD-10-CM

## 2024-03-29 RX ORDER — DEXTROAMPHETAMINE SACCHARATE, AMPHETAMINE ASPARTATE, DEXTROAMPHETAMINE SULFATE AND AMPHETAMINE SULFATE 5; 5; 5; 5 MG/1; MG/1; MG/1; MG/1
20 TABLET ORAL
Qty: 60 TABLET | Refills: 0 | Status: SHIPPED | OUTPATIENT
Start: 2024-03-29

## 2024-04-03 ENCOUNTER — HOSPITAL ENCOUNTER (EMERGENCY)
Facility: HOSPITAL | Age: 24
Discharge: HOME | End: 2024-04-03
Attending: EMERGENCY MEDICINE
Payer: COMMERCIAL

## 2024-04-03 ENCOUNTER — APPOINTMENT (EMERGENCY)
Dept: RADIOLOGY | Facility: HOSPITAL | Age: 24
End: 2024-04-03
Payer: COMMERCIAL

## 2024-04-03 VITALS
DIASTOLIC BLOOD PRESSURE: 57 MMHG | HEART RATE: 95 BPM | OXYGEN SATURATION: 99 % | SYSTOLIC BLOOD PRESSURE: 117 MMHG | RESPIRATION RATE: 20 BRPM | TEMPERATURE: 97.7 F
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
## 2024-04-03 DIAGNOSIS — S16.1XXA STRAIN OF NECK MUSCLE, INITIAL ENCOUNTER: ICD-10-CM

## 2024-04-03 PROCEDURE — 72125 CT NECK SPINE W/O DYE: CPT

## 2024-04-03 PROCEDURE — 99284 EMERGENCY DEPT VISIT MOD MDM: CPT | Performed by: PHYSICIAN ASSISTANT

## 2024-04-03 PROCEDURE — 99284 EMERGENCY DEPT VISIT MOD MDM: CPT

## 2024-04-03 PROCEDURE — 70450 CT HEAD/BRAIN W/O DYE: CPT
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
## 2024-04-03 NOTE — Clinical Note
Nupur Coles was seen and treated in our emergency department on 4/3/2024.                Diagnosis:     Nupur  may return to work on return date.    She may return on this date: 04/05/2024         If you have any questions or concerns, please don't hesitate to call.      Nesha Grace PA-C    ______________________________           _______________          _______________  Hospital Representative                              Date                                Time
No, other reason

## 2024-05-15 ENCOUNTER — HOSPITAL ENCOUNTER (EMERGENCY)
Facility: HOSPITAL | Age: 24
Discharge: HOME | End: 2024-05-15
Attending: EMERGENCY MEDICINE
Payer: COMMERCIAL

## 2024-05-15 ENCOUNTER — APPOINTMENT (EMERGENCY)
Dept: RADIOLOGY | Facility: HOSPITAL | Age: 24
End: 2024-05-15
Payer: COMMERCIAL

## 2024-05-15 VITALS
DIASTOLIC BLOOD PRESSURE: 68 MMHG | TEMPERATURE: 97.8 F | OXYGEN SATURATION: 100 % | HEART RATE: 102 BPM | BODY MASS INDEX: 24.4 KG/M2 | WEIGHT: 151.2 LBS | RESPIRATION RATE: 22 BRPM | SYSTOLIC BLOOD PRESSURE: 132 MMHG

## 2024-05-15 DIAGNOSIS — F41.9 ANXIETY: ICD-10-CM

## 2024-05-15 DIAGNOSIS — R20.2 PARESTHESIAS: Primary | ICD-10-CM

## 2024-05-15 LAB
ALBUMIN SERPL BCP-MCNC: 4.4 G/DL (ref 3.5–5)
ALP SERPL-CCNC: 43 U/L (ref 34–104)
ALT SERPL W P-5'-P-CCNC: 17 U/L (ref 7–52)
ANION GAP SERPL CALCULATED.3IONS-SCNC: 8 MMOL/L (ref 4–13)
AST SERPL W P-5'-P-CCNC: 18 U/L (ref 13–39)
BASOPHILS # BLD AUTO: 0.03 THOUSANDS/ÂΜL (ref 0–0.1)
BASOPHILS NFR BLD AUTO: 1 % (ref 0–1)
BILIRUB SERPL-MCNC: 0.45 MG/DL (ref 0.2–1)
BILIRUB UR QL STRIP: NEGATIVE
BUN SERPL-MCNC: 11 MG/DL (ref 5–25)
CALCIUM SERPL-MCNC: 9.4 MG/DL (ref 8.4–10.2)
CARDIAC TROPONIN I PNL SERPL HS: <2 NG/L
CHLORIDE SERPL-SCNC: 104 MMOL/L (ref 96–108)
CLARITY UR: CLEAR
CO2 SERPL-SCNC: 24 MMOL/L (ref 21–32)
COLOR UR: COLORLESS
CREAT SERPL-MCNC: 0.71 MG/DL (ref 0.6–1.3)
EOSINOPHIL # BLD AUTO: 0.03 THOUSAND/ÂΜL (ref 0–0.61)
EOSINOPHIL NFR BLD AUTO: 1 % (ref 0–6)
ERYTHROCYTE [DISTWIDTH] IN BLOOD BY AUTOMATED COUNT: 13 % (ref 11.6–15.1)
EXT PREGNANCY TEST URINE: NEGATIVE
EXT. CONTROL: NORMAL
GFR SERPL CREATININE-BSD FRML MDRD: 120 ML/MIN/1.73SQ M
GLUCOSE SERPL-MCNC: 96 MG/DL (ref 65–140)
GLUCOSE UR STRIP-MCNC: NEGATIVE MG/DL
HCT VFR BLD AUTO: 36.6 % (ref 34.8–46.1)
HGB BLD-MCNC: 12 G/DL (ref 11.5–15.4)
HGB UR QL STRIP.AUTO: NEGATIVE
IMM GRANULOCYTES # BLD AUTO: 0.01 THOUSAND/UL (ref 0–0.2)
IMM GRANULOCYTES NFR BLD AUTO: 0 % (ref 0–2)
KETONES UR STRIP-MCNC: NEGATIVE MG/DL
LEUKOCYTE ESTERASE UR QL STRIP: NEGATIVE
LYMPHOCYTES # BLD AUTO: 2.31 THOUSANDS/ÂΜL (ref 0.6–4.47)
LYMPHOCYTES NFR BLD AUTO: 45 % (ref 14–44)
MCH RBC QN AUTO: 28.5 PG (ref 26.8–34.3)
MCHC RBC AUTO-ENTMCNC: 32.8 G/DL (ref 31.4–37.4)
MCV RBC AUTO: 87 FL (ref 82–98)
MONOCYTES # BLD AUTO: 0.43 THOUSAND/ÂΜL (ref 0.17–1.22)
MONOCYTES NFR BLD AUTO: 8 % (ref 4–12)
NEUTROPHILS # BLD AUTO: 2.32 THOUSANDS/ÂΜL (ref 1.85–7.62)
NEUTS SEG NFR BLD AUTO: 45 % (ref 43–75)
NITRITE UR QL STRIP: NEGATIVE
NRBC BLD AUTO-RTO: 0 /100 WBCS
PH UR STRIP.AUTO: 6 [PH]
PLATELET # BLD AUTO: 199 THOUSANDS/UL (ref 149–390)
PMV BLD AUTO: 10.1 FL (ref 8.9–12.7)
POTASSIUM SERPL-SCNC: 3.5 MMOL/L (ref 3.5–5.3)
PROT SERPL-MCNC: 7 G/DL (ref 6.4–8.4)
PROT UR STRIP-MCNC: NEGATIVE MG/DL
RBC # BLD AUTO: 4.21 MILLION/UL (ref 3.81–5.12)
SODIUM SERPL-SCNC: 136 MMOL/L (ref 135–147)
SP GR UR STRIP.AUTO: 1.01 (ref 1–1.03)
UROBILINOGEN UR STRIP-ACNC: <2 MG/DL
WBC # BLD AUTO: 5.13 THOUSAND/UL (ref 4.31–10.16)

## 2024-05-15 PROCEDURE — 81003 URINALYSIS AUTO W/O SCOPE: CPT | Performed by: EMERGENCY MEDICINE

## 2024-05-15 PROCEDURE — 93005 ELECTROCARDIOGRAM TRACING: CPT

## 2024-05-15 PROCEDURE — 99285 EMERGENCY DEPT VISIT HI MDM: CPT

## 2024-05-15 PROCEDURE — 80053 COMPREHEN METABOLIC PANEL: CPT | Performed by: EMERGENCY MEDICINE

## 2024-05-15 PROCEDURE — 36415 COLL VENOUS BLD VENIPUNCTURE: CPT | Performed by: EMERGENCY MEDICINE

## 2024-05-15 PROCEDURE — 85025 COMPLETE CBC W/AUTO DIFF WBC: CPT | Performed by: EMERGENCY MEDICINE

## 2024-05-15 PROCEDURE — 70450 CT HEAD/BRAIN W/O DYE: CPT

## 2024-05-15 PROCEDURE — 99284 EMERGENCY DEPT VISIT MOD MDM: CPT | Performed by: EMERGENCY MEDICINE

## 2024-05-15 PROCEDURE — 84484 ASSAY OF TROPONIN QUANT: CPT | Performed by: EMERGENCY MEDICINE

## 2024-05-15 PROCEDURE — 81025 URINE PREGNANCY TEST: CPT | Performed by: EMERGENCY MEDICINE

## 2024-05-15 RX ORDER — ALPRAZOLAM 0.25 MG/1
0.25 TABLET ORAL ONCE
Status: COMPLETED | OUTPATIENT
Start: 2024-05-15 | End: 2024-05-15

## 2024-05-15 RX ADMIN — ALPRAZOLAM 0.25 MG: 0.25 TABLET ORAL at 16:31

## 2024-05-16 NOTE — ED PROVIDER NOTES
History  Chief Complaint   Patient presents with    Numbness     Pt arrives with mom, reported that she was driving and having facial numbness and L side of numbness. Pt is awake alert, no focal weakness, no pain. Equal  on BLE and no drips to all extremities. Pt reported that she has hx of panic attacks. Pt is anxious and hyperventilate and advice to deep breathing.      Patient presents for evaluation of numbness along left side of her body.  Patient states she was driving talking with her boyfriend on FaceTime when she felt like she was stuttering and then her whole body felt like it was spasming and then she felt tingling on the left side of her body.  Has a history of panic attacks and arrives anxious and hyperventilating.      History provided by:  Patient   used: No        Prior to Admission Medications   Prescriptions Last Dose Informant Patient Reported? Taking?   amphetamine-dextroamphetamine (ADDERALL, 20MG,) 20 mg tablet 5/15/2024  No Yes   Sig: Take 1 tablet (20 mg total) by mouth 2 (two) times a day Max Daily Amount: 40 mg   citalopram (CeleXA) 40 mg tablet   No No   Sig: TAKE 1 TABLET BY MOUTH EVERY DAY   fluticasone (FLONASE) 50 mcg/act nasal spray   No No   Si sprays into each nostril daily   Patient taking differently: 2 sprays into each nostril as needed   norethindrone (Ortho Micronor) 0.35 MG tablet   No No   Sig: Take 1 tablet (0.35 mg total) by mouth daily      Facility-Administered Medications: None       Past Medical History:   Diagnosis Date    Complication of left ear piercing     last assessed: 17    Exposure to blood or body fluid     last assessed: 17    Moderate major depression (HCC)     last assessed: 17       Past Surgical History:   Procedure Laterality Date    FRACTURE SURGERY Left     femur       Family History   Problem Relation Age of Onset    Other Mother         Seizures    Seasonal affective disorder Mother     Gout Father      Hypertension Father     Endometriosis Maternal Aunt      I have reviewed and agree with the history as documented.    E-Cigarette/Vaping    E-Cigarette Use Never User      E-Cigarette/Vaping Substances    Nicotine No     THC No     CBD No     Flavoring No     Other No     Unknown No      Social History     Tobacco Use    Smoking status: Never    Smokeless tobacco: Never   Vaping Use    Vaping status: Never Used   Substance Use Topics    Alcohol use: Not Currently     Comment: rare    Drug use: Never       Review of Systems   Neurological:  Positive for numbness.   All other systems reviewed and are negative.      Physical Exam  Physical Exam  Vitals and nursing note reviewed.   Constitutional:       General: She is not in acute distress.  HENT:      Head: Atraumatic.   Eyes:      Extraocular Movements: Extraocular movements intact.      Pupils: Pupils are equal, round, and reactive to light.   Cardiovascular:      Rate and Rhythm: Normal rate and regular rhythm.   Pulmonary:      Effort: Pulmonary effort is normal. No respiratory distress.      Breath sounds: Normal breath sounds.   Abdominal:      Tenderness: There is no abdominal tenderness.   Skin:     Capillary Refill: Capillary refill takes less than 2 seconds.      Findings: No rash.   Neurological:      General: No focal deficit present.      Mental Status: She is alert and oriented to person, place, and time.      Cranial Nerves: No cranial nerve deficit.      Sensory: No sensory deficit.      Motor: No weakness.      Coordination: Coordination normal.      Gait: Gait normal.         Vital Signs  ED Triage Vitals [05/15/24 1618]   Temperature Pulse Respirations Blood Pressure SpO2   97.8 °F (36.6 °C) 102 22 132/68 100 %      Temp Source Heart Rate Source Patient Position - Orthostatic VS BP Location FiO2 (%)   Oral Monitor Sitting Right arm --      Pain Score       No Pain           Vitals:    05/15/24 1618   BP: 132/68   Pulse: 102   Patient Position -  Orthostatic VS: Sitting         Visual Acuity      ED Medications  Medications   ALPRAZolam (XANAX) tablet 0.25 mg (0.25 mg Oral Given 5/15/24 1631)       Diagnostic Studies  Results Reviewed       Procedure Component Value Units Date/Time    UA (URINE) with reflex to Scope [[id number]] Collected: 05/15/24 1734    Lab Status: Final result Specimen: Urine, Clean Catch Updated: 05/15/24 1756     Color, UA Colorless     Clarity, UA Clear     Specific Gravity, UA 1.015     pH, UA 6.0     Leukocytes, UA Negative     Nitrite, UA Negative     Protein, UA Negative mg/dl      Glucose, UA Negative mg/dl      Ketones, UA Negative mg/dl      Urobilinogen, UA <2.0 mg/dl      Bilirubin, UA Negative     Occult Blood, UA Negative    POCT pregnancy, urine [[id number]]  (Normal) Resulted: 05/15/24 1738    Lab Status: Final result Updated: 05/15/24 1738     EXT Preg Test, Ur Negative     Control Valid    HS Troponin 0hr (reflex protocol) [[id number]]  (Normal) Collected: 05/15/24 1703    Lab Status: Final result Specimen: Blood from Arm, Right Updated: 05/15/24 1734     hs TnI 0hr <2 ng/L     Comprehensive metabolic panel [[id number]] Collected: 05/15/24 1703    Lab Status: Final result Specimen: Blood from Arm, Right Updated: 05/15/24 1731     Sodium 136 mmol/L      Potassium 3.5 mmol/L      Chloride 104 mmol/L      CO2 24 mmol/L      ANION GAP 8 mmol/L      BUN 11 mg/dL      Creatinine 0.71 mg/dL      Glucose 96 mg/dL      Calcium 9.4 mg/dL      AST 18 U/L      ALT 17 U/L      Alkaline Phosphatase 43 U/L      Total Protein 7.0 g/dL      Albumin 4.4 g/dL      Total Bilirubin 0.45 mg/dL      eGFR 120 ml/min/1.73sq m     Narrative:      National Kid[employer] Foundation guidelines for Chronic Kidney Disease (CKD):     Stage 1 with normal or high GFR (GFR > 90 mL/min/1.73 square meters)    Stage 2 Mild CKD (GFR = 60-89 mL/min/1.73 square meters)    Stage 3A Moderate CKD (GFR = 45-59 mL/min/1.73 square meters)    Stage 3B Moderate CKD (GFR  = 30-44 mL/min/1.73 square meters)    Stage 4 Severe CKD (GFR = 15-29 mL/min/1.73 square meters)    Stage 5 End Stage CKD (GFR <15 mL/min/1.73 square meters)  Note: GFR calculation is accurate only with a steady state creatinine    CBC and differential [[id number]]  (Abnormal) Collected: 05/15/24 1703    Lab Status: Final result Specimen: Blood from Arm, Right Updated: 05/15/24 1710     WBC 5.13 Thousand/uL      RBC 4.21 Million/uL      Hemoglobin 12.0 g/dL      Hematocrit 36.6 %      MCV 87 fL      MCH 28.5 pg      MCHC 32.8 g/dL      RDW 13.0 %      MPV 10.1 fL      Platelets 199 Thousands/uL      nRBC 0 /100 WBCs      Segmented % 45 %      Immature Grans % 0 %      Lymphocytes % 45 %      Monocytes % 8 %      Eosinophils Relative 1 %      Basophils Relative 1 %      Absolute Neutrophils 2.32 Thousands/µL      Absolute Immature Grans 0.01 Thousand/uL      Absolute Lymphocytes 2.31 Thousands/µL      Absolute Monocytes 0.43 Thousand/µL      Eosinophils Absolute 0.03 Thousand/µL      Basophils Absolute 0.03 Thousands/µL                    CT head without contrast   Final Result by Denisse Serrato MD (05/15 1736)      No acute intracranial abnormality.         Workstation performed: [id number]                    Procedures  Procedures         ED Course                  Stroke Assessment       Row Name 05/15/24 1639 05/16/24 1351          NIH Stroke Scale    Interval Baseline Baseline     Level of Consciousness (1a.) 0 0     LOC Questions (1b.) 0 0     LOC Commands (1c.) 0 0     Best Gaze (2.) 0 0     Visual (3.) 0 0     Facial Palsy (4.) 0 0     Motor Arm, Left (5a.) 0 0     Motor Arm, Right (5b.) 0 0     Motor Leg, Left (6a.) 0 0     Motor Leg, Right (6b.) 0 0     Limb Ataxia (7.) 0 0     Sensory (8.) 0 0     Best Language (9.) 0 0     Dysarthria (10.) 0 0     Extinction and Inattention (11.) (Formerly Neglect) 0 0     Total 0 0                                             Medical Decision Making  Pulse ox 100% on room  air indicating adequate oxygenation.    Amount and/or Complexity of Data Reviewed  Labs: ordered.  Radiology: ordered.  ECG/medicine tests: ordered and independent interpretation performed.    Risk  Prescription drug management.             Disposition  Final diagnoses:   Paresthesias   Anxiety     Time reflects when diagnosis was documented in both MDM as applicable and the Disposition within this note       Time User Action Codes Description Comment    5/15/2024  6:01 PM Umair Tran [R20.2] Paresthesias     5/15/2024  6:01 PM Umair Tran [F41.9] Anxiety           ED Disposition       ED Disposition   Discharge    Condition   Stable    Date/Time   Wed May 15, 2024  5:49 PM    Comment   Nupur Coles discharge to home/self care.                   Follow-up Information       Follow up With Specialties Details Why Contact Info    Denisse Mcconnell DO Family Medicine In 1 week  [address] [address]  [phone]              Discharge Medication List as of 5/15/2024  6:01 PM        CONTINUE these medications which have NOT CHANGED    Details   amphetamine-dextroamphetamine (ADDERALL, 20MG,) 20 mg tablet Take 1 tablet (20 mg total) by mouth 2 (two) times a day Max Daily Amount: 40 mg, Starting Fri 3/29/2024, Normal      citalopram (CeleXA) 40 mg tablet TAKE 1 TABLET BY MOUTH EVERY DAY, Starting Mon 9/25/2023, Normal      fluticasone (FLONASE) 50 mcg/act nasal spray 2 sprays into each nostril daily, Starting Tue 3/28/2023, Normal      norethindrone (Ortho Micronor) 0.35 MG tablet Take 1 tablet (0.35 mg total) by mouth daily, Starting Mon 8/28/2023, Normal             No discharge procedures on file.    PDMP Review         Value Time User    PDMP Reviewed  Yes 3/29/2024 12:59 PM Denisse Mcconnell DO            ED Provider  Electronically Signed by             Umiar Tran DO  05/16/24 7202

## 2024-05-19 LAB
ATRIAL RATE: 78 BPM
P AXIS: 49 DEGREES
PR INTERVAL: 166 MS
QRS AXIS: 85 DEGREES
QRSD INTERVAL: 88 MS
QT INTERVAL: 376 MS
QTC INTERVAL: 428 MS
T WAVE AXIS: 21 DEGREES
VENTRICULAR RATE: 78 BPM

## 2024-05-19 PROCEDURE — 93010 ELECTROCARDIOGRAM REPORT: CPT | Performed by: INTERNAL MEDICINE

## 2024-07-15 ENCOUNTER — OFFICE VISIT (OUTPATIENT)
Dept: FAMILY MEDICINE CLINIC | Facility: CLINIC | Age: 24
End: 2024-07-15
Payer: COMMERCIAL

## 2024-07-15 VITALS
TEMPERATURE: 99.2 F | RESPIRATION RATE: 16 BRPM | SYSTOLIC BLOOD PRESSURE: 110 MMHG | WEIGHT: 153 LBS | HEART RATE: 80 BPM | HEIGHT: 66 IN | BODY MASS INDEX: 24.59 KG/M2 | DIASTOLIC BLOOD PRESSURE: 62 MMHG

## 2024-07-15 DIAGNOSIS — M25.552 LEFT HIP PAIN: Primary | ICD-10-CM

## 2024-07-15 DIAGNOSIS — Z30.41 ORAL CONTRACEPTIVE PILL SURVEILLANCE: ICD-10-CM

## 2024-07-15 PROCEDURE — 99213 OFFICE O/P EST LOW 20 MIN: CPT | Performed by: FAMILY MEDICINE

## 2024-07-15 RX ORDER — ACETAMINOPHEN AND CODEINE PHOSPHATE 120; 12 MG/5ML; MG/5ML
1 SOLUTION ORAL DAILY
Qty: 84 TABLET | Refills: 1 | Status: SHIPPED | OUTPATIENT
Start: 2024-07-15

## 2024-07-15 NOTE — PROGRESS NOTES
Assessment/Plan:    1. Left hip pain  -     Ambulatory Referral to Physical Therapy; Future          There are no Patient Instructions on file for this visit.    No follow-ups on file.    Subjective:      Patient ID: Nupur Coles is a 24 y.o. female.    Chief Complaint   Patient presents with   • Hip Pain     Left hip pain; ongoing but more when she works out   YC       Pt is here for a referaal for physical therapy  PT states she broke her leg when she was 11 and states at that time she feels like something happened with that injury - states she feels she has a problem with her pelvis and her hip    She will have hip pain when she exercisies her leg  Has been getting worse over the last few months  Mild knee pain is present  Same side          The following portions of the patient's history were reviewed and updated as appropriate: allergies, current medications, past family history, past medical history, past social history, past surgical history and problem list.    Review of Systems   Musculoskeletal:  Positive for arthralgias and myalgias.         Current Outpatient Medications   Medication Sig Dispense Refill   • amphetamine-dextroamphetamine (ADDERALL, 20MG,) 20 mg tablet Take 1 tablet (20 mg total) by mouth 2 (two) times a day Max Daily Amount: 40 mg 60 tablet 0   • diazePAM (Valium) 10 mg rectal suppository UNWRAP AND INSERT 1 SUPPOSITORY VAGINALLY 1 HOUR PRIOR TO INTERCOURSE/THERAPY AS NEEDED, NIGHTLY, UP TO 3 TIMES DAILY     • fluticasone (FLONASE) 50 mcg/act nasal spray 2 sprays into each nostril daily (Patient taking differently: 2 sprays into each nostril as needed) 48 g 1   • norethindrone (MICRONOR) 0.35 MG tablet TAKE 1 TABLET BY MOUTH EVERY DAY 84 tablet 1   • citalopram (CeleXA) 40 mg tablet TAKE 1 TABLET BY MOUTH EVERY DAY (Patient not taking: Reported on 7/15/2024) 90 tablet 0   • cyclobenzaprine (FLEXERIL) 10 mg tablet Take one pill by mouth two hours prior to your procedure. (Patient not  "taking: Reported on 7/15/2024)       No current facility-administered medications for this visit.       Objective:    /62   Pulse 80   Temp 99.2 °F (37.3 °C) (Tympanic)   Resp 16   Ht 5' 6\" (1.676 m)   Wt 69.4 kg (153 lb)   LMP 06/19/2024 (Approximate)   BMI 24.69 kg/m²        Physical Exam  Musculoskeletal:      Comments: Left lower ext ext rotation  Hips level                Erich Lombardi, DO  "

## 2024-07-29 DIAGNOSIS — L98.9 SKIN LESION: Primary | ICD-10-CM

## 2024-11-15 ENCOUNTER — OFFICE VISIT (OUTPATIENT)
Dept: FAMILY MEDICINE CLINIC | Facility: CLINIC | Age: 24
End: 2024-11-15
Payer: COMMERCIAL

## 2024-11-15 VITALS
HEIGHT: 66 IN | HEART RATE: 89 BPM | SYSTOLIC BLOOD PRESSURE: 110 MMHG | TEMPERATURE: 97.6 F | BODY MASS INDEX: 23.3 KG/M2 | OXYGEN SATURATION: 99 % | DIASTOLIC BLOOD PRESSURE: 58 MMHG | WEIGHT: 145 LBS | RESPIRATION RATE: 16 BRPM

## 2024-11-15 DIAGNOSIS — R39.9 UTI SYMPTOMS: Primary | ICD-10-CM

## 2024-11-15 DIAGNOSIS — R06.00 PND (PAROXYSMAL NOCTURNAL DYSPNEA): Primary | ICD-10-CM

## 2024-11-15 PROCEDURE — 99213 OFFICE O/P EST LOW 20 MIN: CPT | Performed by: NURSE PRACTITIONER

## 2024-11-15 RX ORDER — NITROFURANTOIN 25; 75 MG/1; MG/1
100 CAPSULE ORAL 2 TIMES DAILY
Qty: 10 CAPSULE | Refills: 0 | Status: SHIPPED | OUTPATIENT
Start: 2024-11-15 | End: 2024-11-20

## 2024-11-15 NOTE — PROGRESS NOTES
Name: Nupur Coles      : 2000      MRN: [id number]  Encounter Provider: PARDEEP Edgar  Encounter Date: 11/15/2024   Encounter department: [facility]  :  Assessment & Plan  UTI symptoms  Unable to provide urine specimen in the office today.  Will send Macrobid, however instructed pt to obtain urine sample prior to starting and drop urine off at the lab tomorrow AM.   Will need to return for vaginal cultures if negative for UTI and symptoms continue  Orders:    POCT urine dip    Urinalysis with microscopic; Future    Urine culture; Future    nitrofurantoin (MACROBID) 100 mg capsule; Take 1 capsule (100 mg total) by mouth 2 (two) times a day for 5 days           History of Present Illness     Here today with concern for  UTI.  Yesterday she developed vaginal burning and dysuria.  Today she still has symptoms, started her period today, and has urgency and hesitancy.        Urinary Tract Infection   Pertinent negatives include no chills.     Review of Systems   Constitutional:  Negative for chills and fever.   Respiratory: Negative.     Cardiovascular: Negative.    Gastrointestinal: Negative.    Genitourinary:         See HPI     Current Outpatient Medications on File Prior to Visit   Medication Sig Dispense Refill    amphetamine-dextroamphetamine (ADDERALL, 20MG,) 20 mg tablet Take 1 tablet (20 mg total) by mouth 2 (two) times a day Max Daily Amount: 40 mg 60 tablet 0    fluticasone (FLONASE) 50 mcg/act nasal spray 2 sprays into each nostril daily 48 g 1    norethindrone (MICRONOR) 0.35 MG tablet TAKE 1 TABLET BY MOUTH EVERY DAY 84 tablet 1    citalopram (CeleXA) 40 mg tablet TAKE 1 TABLET BY MOUTH EVERY DAY (Patient not taking: Reported on 7/15/2024) 90 tablet 0    cyclobenzaprine (FLEXERIL) 10 mg tablet Take one pill by mouth two hours prior to your procedure. (Patient not taking: Reported on 7/15/2024)      diazePAM (Valium) 10 mg rectal suppository UNWRAP AND INSERT 1  "SUPPOSITORY VAGINALLY 1 HOUR PRIOR TO INTERCOURSE/THERAPY AS NEEDED, NIGHTLY, UP TO 3 TIMES DAILY (Patient not taking: Reported on 11/15/2024)       No current facility-administered medications on file prior to visit.      Social History     Tobacco Use    Smoking status: Never     Passive exposure: Never    Smokeless tobacco: Never   Vaping Use    Vaping status: Never Used   Substance and Sexual Activity    Alcohol use: Not Currently     Comment: rare    Drug use: Never    Sexual activity: Yes     Partners: Male     Comment: pt declines hiv std testing        Objective   /58 (BP Location: Left arm, Patient Position: Sitting, Cuff Size: Standard)   Pulse 89   Temp 97.6 °F (36.4 °C) (Temporal)   Resp 16   Ht 5' 6\" (1.676 m)   Wt 65.8 kg (145 lb)   LMP 11/15/2024 (Exact Date)   SpO2 99%   BMI 23.40 kg/m²      Physical Exam  Vitals and nursing note reviewed.   Constitutional:       General: She is not in acute distress.     Appearance: Normal appearance.   HENT:      Head: Normocephalic and atraumatic.   Eyes:      Conjunctiva/sclera: Conjunctivae normal.   Neck:      Vascular: No carotid bruit.   Cardiovascular:      Rate and Rhythm: Normal rate and regular rhythm.      Pulses: Normal pulses.      Heart sounds: Normal heart sounds. No murmur heard.  Pulmonary:      Effort: Pulmonary effort is normal.      Breath sounds: Normal breath sounds.   Skin:     General: Skin is warm and dry.   Neurological:      Mental Status: She is alert.   Psychiatric:         Mood and Affect: Mood normal.         Behavior: Behavior normal.         "

## 2024-11-21 ENCOUNTER — RESULTS FOLLOW-UP (OUTPATIENT)
Dept: FAMILY MEDICINE CLINIC | Facility: CLINIC | Age: 24
End: 2024-11-21

## 2024-11-21 DIAGNOSIS — R39.9 UTI SYMPTOMS: Primary | ICD-10-CM

## 2024-11-21 LAB
APPEARANCE UR: CLEAR
BACTERIA UR CULT: ABNORMAL
BACTERIA URNS QL MICRO: ABNORMAL
BILIRUB UR QL STRIP: NEGATIVE
CASTS URNS QL MICRO: ABNORMAL /LPF
COLOR UR: YELLOW
EPI CELLS #/AREA URNS HPF: ABNORMAL /HPF (ref 0–10)
GLUCOSE UR QL: NEGATIVE
HGB UR QL STRIP: ABNORMAL
KETONES UR QL STRIP: NEGATIVE
LEUKOCYTE ESTERASE UR QL STRIP: ABNORMAL
Lab: ABNORMAL
MICRO URNS: ABNORMAL
NITRITE UR QL STRIP: NEGATIVE
PH UR STRIP: 7 [PH] (ref 5–7.5)
PROT UR QL STRIP: NEGATIVE
RBC #/AREA URNS HPF: ABNORMAL /HPF (ref 0–2)
SL AMB ANTIMICROBIAL SUSCEPTIBILITY: ABNORMAL
SP GR UR: 1.01 (ref 1–1.03)
UROBILINOGEN UR STRIP-ACNC: 0.2 MG/DL (ref 0.2–1)
WBC #/AREA URNS HPF: ABNORMAL /HPF (ref 0–5)

## 2024-11-21 RX ORDER — SULFAMETHOXAZOLE AND TRIMETHOPRIM 800; 160 MG/1; MG/1
1 TABLET ORAL EVERY 12 HOURS SCHEDULED
Qty: 10 TABLET | Refills: 0 | Status: SHIPPED | OUTPATIENT
Start: 2024-11-21 | End: 2024-11-26

## 2024-11-25 ENCOUNTER — EVALUATION (OUTPATIENT)
Dept: PHYSICAL THERAPY | Facility: CLINIC | Age: 24
End: 2024-11-25
Payer: COMMERCIAL

## 2024-11-25 DIAGNOSIS — M25.552 LEFT HIP PAIN: ICD-10-CM

## 2024-11-25 PROCEDURE — 97112 NEUROMUSCULAR REEDUCATION: CPT

## 2024-11-25 PROCEDURE — 97162 PT EVAL MOD COMPLEX 30 MIN: CPT

## 2024-11-25 NOTE — PROGRESS NOTES
PT Evaluation   Today's date: 2024  Patient name: Nupur Coles  : 2000  MRN: [id number]  Referring provider: Lombardi, Frank, DO  Dx:   Encounter Diagnosis     ICD-10-CM    1. Left hip pain  M25.552 Ambulatory Referral to Physical Therapy          Patient was co-treated by JARED Ma and Gladis Cohen PT, DPT under my direct supervision.     Assessment  Assessment details: Patient is a 24 y.o. Female who presents to skilled outpatient PT for referring diagnoses include left hip pain. Primary movement impairment diagnosis of decreased power, muscular imbalance R > L, decreased hip IR ROM bilaterally resulting in pathoanatomical symptoms of nociceptive pain with high intensity recreational activities. The aforementioned impairments have limited the patient's ability to lift at the gym without pain and jogging recreationally. No further referral is necessary at this time based upon examination results. Patient education performed during today's session included: HEP and anatomy. 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     Impairments: Abnormal or restricted ROM, Activity intolerance, Impaired physical strength, Lacks appropriate HEP, and Pain with function  Understanding of Dx/Px/POC: Good  Prognosis: Good    Patient verbalized understanding of POC. Please contact me if you have any questions or recommendations. Thank you for the referral and the opportunity to share in Nupur Coles's care.    Plan  Patient would benefit from: PT Eval and Skilled PT  Planned modality interventions: Dry needling, Biofeedback, Cryotherapy, TENS, Thermotherapy: Hydrocollator Packs, and Traction  Planned therapy interventions: Abdominal trunk stabilization, Dry Needling, Functional ROM exercises, HEP, Joint mobilization, Manual therapy, Baeza taping, Neuromuscular re-education, Patient education, Strengthening, Therapeutic  activities, and Therapeutic exercises  Frequency: 2x/wk  Duration in weeks: 8  Plan of Care beginning date: 24  Plan of Care expiration date: 8 weeks - 2025  Treatment plan discussed with: Patient       Goals  Short Term Goals (4 weeks):    - Patient will be independent in basic HEP 2-3 weeks  - Patient will demonstrate knee ROM WNL for ease with gait.   - Patient will have 0/10 pain at rest  - Patient will demonstrate >1/3 improvement in MMT grade as applicable    Long Term Goals (8 weeks):  - Patient will be independent in a comprehensive home exercise program  - Patient FOTO score will improve by 10 points.   - Patient will independently ambulate >1000 feet (community ambulation)  - Patient will self-report >75% improvement in function  - Patient  will be able resume lifting activities with min to no hip pain  - Patient  will be able to jog with min to no hip pain       Subjective    History of Present Illness  - Mechanism of injury: Pt broke L femur when she was 11 years old. Had a sedentary lifestyle before starting to lift at gym when she was [age] years old. She has noticed that she has a muscle imbalance with R > L. Currently, she is experiencing a dull, constant pain in L lateral hip.     - Functional limitations: walking after sitting for long periods working as a      - Patient goals: hip and knee pain to decrease, be able to jog       - Red flag screening:   Positive for: recent infection  Negative for: age >50 years old, history of cancer, fever, chills, night sweats, weight loss, immunosuppression, rest/night pain, saddle anesthesia, bladder dysfunction, and LE neurological deficits    Pain  - Current pain ratin/10  - At best pain ratin/10  - At worst pain ratin/10  - Location: lateral side of L hip   - Alleviating factors: take a day or two off from exercising      Objective     LE MMT  L Hip Flexion: 5/5  R Hip Flexion: 5/5   L Hip Extension: NT  R Hip Extension: NT   L  Hip Abduction: NT R Hip Abduction: NT   L Hip Adduction: NT R Hip Adduction: NT   L Hip IR: 4+/5 with pain  R Hip IR: 5/5   L Hip ER: 5/5 R Hip ER: 5/5   L Knee Extension: 5/5 R Knee Extension: 5/5   L Knee Flexion: 5/5 R Knee Flexion: 5/5   L Ankle DF: 5/5 R Ankle DF: 5/5   L Ankle PF: NT  R Ankle PF: NT       LE ROM    L hip flexion: WNL R hip flexion: WNL   L hip extension: NT R hip extension: NT   L hip IR: limited  R hip IR: limited   L hip ER: WNL  R hip ER: WNL    L hip abduction: NT R hip abduction: NT       Special Testing L R   FABIR negative negative   FADIR positive  negative   Hip Scour  positive  negative   Thigh thrust NT NT   Flick test  NT NT   Standing flexion test  positive  negative   Prone knee flexion test NT NT    Supine to long sit test positive  negative   Neurodynamic assessment  NT NT      L innominate was observed to be anteriorly rotated, posterior thrust was performed.   Grade 5 long axis manipulation was performed due to upslip on L side.     Sensation  - Light touch: NT    Hip Comments  - Static standing foot alignment: NT  - Functional squat: NT               Insurance Eval/ Re-eval POC expires Auth Status Total visits  Start date  Expiration date Misc    11/25/24 1/20/25                      Precautions: anxiety  Past Medical History:   Diagnosis Date    Complication of left ear piercing     last assessed: 09/11/17    Exposure to blood or body fluid     last assessed: 01/31/17    Moderate major depression (HCC)     last assessed: 07/25/17         Date 11/25/2024        Visit Number IE    FOTO    Auth                  Manual         Posterior thrust of L hip  Performed         Grade V Long axis manipulation of L hip  Performed                  Neuro Re-ed         Hip burners  HEP        Clamshells HEP        Bridge  HEP        Quad set                   TherEx         NuStep warmup         Squats         Side stepping with loop         HR         SLR          LAQ         SAQ                                                                         TherAct         Patient education                                             Gait Training                                    Modalities         CP

## 2024-12-03 ENCOUNTER — OFFICE VISIT (OUTPATIENT)
Dept: PHYSICAL THERAPY | Facility: CLINIC | Age: 24
End: 2024-12-03
Payer: COMMERCIAL

## 2024-12-03 DIAGNOSIS — M25.552 LEFT HIP PAIN: Primary | ICD-10-CM

## 2024-12-03 PROCEDURE — 97112 NEUROMUSCULAR REEDUCATION: CPT

## 2024-12-03 PROCEDURE — 97110 THERAPEUTIC EXERCISES: CPT

## 2024-12-03 NOTE — PROGRESS NOTES
Daily Note     Today's date: 12/3/2024  Patient name: Nupur Coles  : 2000  MRN: [id number]  Referring provider: Lombardi, Frank, DO  Dx:   Encounter Diagnosis     ICD-10-CM    1. Left hip pain  M25.552           Start Time: 1500  Stop Time: 1545  Total time in clinic (min): 45 minutes    Subjective: reports running a 5k and feeling pretty good, feels hip is out again      Objective: See treatment diary below      Assessment: Tolerated treatment well. Patient would benefit from continued PT in order to build quads, hips and glutes to support hip. Positive in forward flexion and supine to sit for anteriorly rotated left innominate which responded well to posterior thrust. Hip fatigue by end of session. Continue to progress strengthening. Updated and reviewed HEP.       Plan: Continue per plan of care.        Insurance Eval/ Re-eval POC expires Auth Status Total visits  Start date  Expiration date Misc    24  60                    Precautions: anxiety  Past Medical History:   Diagnosis Date    Complication of left ear piercing     last assessed: 17    Exposure to blood or body fluid     last assessed: 17    Moderate major depression (HCC)     last assessed: 17         Date 2024 12/3/24       Visit Number IE 2   FOTO    Auth                  Manual         Posterior thrust of L hip  Performed         Grade V Long axis manipulation of L hip  Performed                  Neuro Re-ed         Hip burners  HEP 2*10 5 sec hold       Clamshells HEP 2*10 BTB+ reverse 15x       Bridge  HEP 2*10 with pilates ring, ring with SL ext, with iso er 5 x 10 sec        Quad set   Lateral walks 15' x2,  monster walks, reverse skater BTB                TherEx         NuStep warmup         Squats  TRX 15x + 10       Side stepping with loop         HR  Leg press 95, 105, 115, 125# 15x ea       SLR          LAQ         SAQ                                                                         TherAct         Patient education                                             Gait Training                                    Modalities         CP         Access Code: [email or web address]  URL: [email or web address]  Date: 12/03/2024  Prepared by: Gladis Nobles-Esteban    Exercises  - Side Stepping with Resistance at Thighs  - 1 x daily - 7 x weekly - 3 sets - 10 reps  - Forward Monster Walk with Resistance (BKA)  - 1 x daily - 7 x weekly - 3 sets - 10 reps  - Backward Monster Walks  - 1 x daily - 7 x weekly - 3 sets - 10 reps

## 2024-12-09 ENCOUNTER — OFFICE VISIT (OUTPATIENT)
Dept: PHYSICAL THERAPY | Facility: CLINIC | Age: 24
End: 2024-12-09
Payer: COMMERCIAL

## 2024-12-09 DIAGNOSIS — M25.552 LEFT HIP PAIN: Primary | ICD-10-CM

## 2024-12-09 PROCEDURE — 97110 THERAPEUTIC EXERCISES: CPT

## 2024-12-09 PROCEDURE — 97112 NEUROMUSCULAR REEDUCATION: CPT

## 2024-12-09 NOTE — PROGRESS NOTES
Daily Note     Today's date: 2024  Patient name: Nupur Coles  : 2000  MRN: [id number]  Referring provider: Lombardi, Frank, DO  Dx: No diagnosis found.               Subjective: Walked a lot in [place], left hip hurts, feels out of alignment      Objective: See treatment diary below      Assessment: Tolerated treatment well. Patient would benefit from continued PT in order to strengthen left glute med, max, and ERs. Noted off shifting during squat from left hip.  Left hip hyper activation during RDLs. Positive in forward flexion and supine to sit tests for anterior shift of left innominate.       Plan: Continue per plan of care.        Insurance Eval/ Re-eval POC expires Auth Status Total visits  Start date  Expiration date Misc    24  60                    Precautions: anxiety  Past Medical History:   Diagnosis Date    Complication of left ear piercing     last assessed: 17    Exposure to blood or body fluid     last assessed: 17    Moderate major depression (HCC)     last assessed: 17         Date 2024 12/3/24       Visit Number IE 2   FOTO    Auth                  Manual         Posterior thrust of L hip  Performed         Grade V Long axis manipulation of L hip  Performed                  Neuro Re-ed         Hip burners  HEP 2*10 5 sec hold       Clamshells HEP 2*10 BTB+ reverse 15x 25 L, 15 R GTB      Bridge  HEP 2*10 with pilates ring, ring with SL ext, with iso er 5 x 10 sec  SL + 25# 2*15      Quad set   Lateral walks 15' x2,  monster walks, reverse skater BTB lateral walks 15' x2,  monster walks, reverse skater BTB               TherEx         NuStep warmup         Squats  TRX 15x + 10 TRX 25x- left leg forward      Side stepping with loop         HR  Leg press 95, 105, 115, 125# 15x ea leg press 95, 105, 115, 125# 15x ea      SLR          LAQ   70# RDL 10x      SAQ   10 RDL LLE                                                                     TherAct          Patient education                                             Gait Training                                    Modalities         CP         Access Code: [email or web address]  URL: [email or web address]  Date: 12/03/2024  Prepared by: Gladis Cohen    Exercises  - Side Stepping with Resistance at Thighs  - 1 x daily - 7 x weekly - 3 sets - 10 reps  - Forward Monster Walk with Resistance (BKA)  - 1 x daily - 7 x weekly - 3 sets - 10 reps  - Backward Monster Walks  - 1 x daily - 7 x weekly - 3 sets - 10 reps

## 2024-12-16 ENCOUNTER — OFFICE VISIT (OUTPATIENT)
Dept: FAMILY MEDICINE CLINIC | Facility: CLINIC | Age: 24
End: 2024-12-16
Payer: COMMERCIAL

## 2024-12-16 ENCOUNTER — OFFICE VISIT (OUTPATIENT)
Dept: PHYSICAL THERAPY | Facility: CLINIC | Age: 24
End: 2024-12-16
Payer: COMMERCIAL

## 2024-12-16 VITALS
RESPIRATION RATE: 16 BRPM | HEIGHT: 67 IN | TEMPERATURE: 97.8 F | WEIGHT: 144.8 LBS | DIASTOLIC BLOOD PRESSURE: 68 MMHG | BODY MASS INDEX: 22.73 KG/M2 | HEART RATE: 74 BPM | SYSTOLIC BLOOD PRESSURE: 90 MMHG
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
## 2024-12-16 DIAGNOSIS — Z11.3 SCREEN FOR STD (SEXUALLY TRANSMITTED DISEASE): ICD-10-CM

## 2024-12-16 DIAGNOSIS — M25.552 LEFT HIP PAIN: Primary | ICD-10-CM

## 2024-12-16 PROCEDURE — 99214 OFFICE O/P EST MOD 30 MIN: CPT | Performed by: FAMILY MEDICINE

## 2024-12-16 PROCEDURE — 97110 THERAPEUTIC EXERCISES: CPT

## 2024-12-16 PROCEDURE — 97112 NEUROMUSCULAR REEDUCATION: CPT

## 2024-12-16 RX ORDER — CEPHALEXIN 500 MG/1
500 CAPSULE ORAL 3 TIMES DAILY
Qty: 30 CAPSULE | Refills: 0 | Status: SHIPPED | OUTPATIENT
Start: 2024-12-16 | End: 2024-12-26

## 2024-12-16 NOTE — PROGRESS NOTES
Assessment/Plan:    No problem-specific Assessment & Plan notes found for this encounter.    Bug bite with superficial infection  Abx po  Continue topical care  F/u if no better    Fears of STD from tattoo exposure  Discussed risks  Has been over 6m  Tattoo ist touched a sharps container per pt while working on her  No symptoms but has fears  Reordered testing     Diagnoses and all orders for this visit:    Bug bite, initial encounter  -     cephalexin (KEFLEX) 500 mg capsule; Take 1 capsule (500 mg total) by mouth 3 (three) times a day for 10 days    Screen for STD (sexually transmitted disease)  -     HIV 1/2 AG/AB W REFLEX [facility] only; Future  -     Hepatitis panel, acute; Future  -     HIV 1/2 AG/AB W REFLEX [facility] only  -     Hepatitis panel, acute        No follow-ups on file.    Subjective:      Patient ID: Nupur Coles is a 24 y.o. female.    Chief Complaint   Patient presents with    Insect Bite     Cmavp        HPI  [facility] [employer]  Histotechnologist    Right back side  2d ago, bitten  Woke with it, felt sudden sharp px  Spider? Not seen  Burning sensation  Was in own bed  2 dots seen  No fever  No body aches  Neosporin used  Pus noted  Some nausea, from anxiety?  No sob or c/d    The following portions of the patient's history were reviewed and updated as appropriate: allergies, current medications, past family history, past medical history, past social history, past surgical history and problem list.    Review of Systems   Constitutional:  Negative for chills and fever.         Current Outpatient Medications   Medication Sig Dispense Refill    amphetamine-dextroamphetamine (ADDERALL, 20MG,) 20 mg tablet Take 1 tablet (20 mg total) by mouth 2 (two) times a day Max Daily Amount: 40 mg 60 tablet 0    cephalexin (KEFLEX) 500 mg capsule Take 1 capsule (500 mg total) by mouth 3 (three) times a day for 10 days 30 capsule 0    fluticasone (FLONASE) 50 mcg/act nasal spray 2 sprays into  "each nostril daily 48 g 1    norethindrone (MICRONOR) 0.35 MG tablet TAKE 1 TABLET BY MOUTH EVERY DAY 84 tablet 1     No current facility-administered medications for this visit.       Objective:    BP 90/68   Pulse 74   Temp 97.8 °F (36.6 °C)   Resp 16   Ht 5' 7\" (1.702 m)   Wt 65.7 kg (144 lb 12.8 oz)   LMP 12/11/2024 (Exact Date)   BMI 22.68 kg/m²        Physical Exam  Vitals and nursing note reviewed.   Constitutional:       General: She is not in acute distress.     Appearance: She is well-developed. She is not ill-appearing.   HENT:      Head: Normocephalic.      Mouth/Throat:      Mouth: Mucous membranes are moist.   Eyes:      General: No scleral icterus.     Conjunctiva/sclera: Conjunctivae normal.   Cardiovascular:      Rate and Rhythm: Normal rate and regular rhythm.   Pulmonary:      Effort: Pulmonary effort is normal. No respiratory distress.      Breath sounds: No wheezing.   Abdominal:      Palpations: Abdomen is soft.   Musculoskeletal:         General: No deformity.      Cervical back: Neck supple.   Skin:     General: Skin is warm and dry.      Coloration: Skin is not pale.      Comments: Right thorax area, slightly indurated, 2 open areas, no pus or blood expressible, no fb, no necrosis   Neurological:      Mental Status: She is alert.      Motor: No weakness.      Gait: Gait normal.   Psychiatric:         Mood and Affect: Mood normal.         Behavior: Behavior normal.         Thought Content: Thought content normal.                Tung Medina, DO    "

## 2024-12-16 NOTE — PROGRESS NOTES
Daily Note     Today's date: 2024  Patient name: Nupur Coles  : 2000  MRN: [id number]  Referring provider: Lombardi, Frank, DO  Dx:   Encounter Diagnosis     ICD-10-CM    1. Left hip pain  M25.552                      Subjective: Feels like left hip is up higher      Objective: See treatment diary below      Assessment: Tolerated treatment well. Patient would benefit from continued PT in order to strengthen VMO and hip abductors. Did well with TRX squats with iso LE ER with towel. Challenged by leg press. Did well with sliders. Right side deficits with piriformis stretch.   Plan: Continue per plan of care.        Insurance Eval/ Re-eval POC expires Auth Status Total visits  Start date  Expiration date Misc    24  60                    Precautions: anxiety  Past Medical History:   Diagnosis Date    Complication of left ear piercing     last assessed: 17    Exposure to blood or body fluid     last assessed: 17    Moderate major depression (HCC)     last assessed: 17         Date 2024 12/3/24  12/16/24     Visit Number IE 2  4 FOTO    Auth                  Manual         Posterior thrust of L hip  Performed    Gr V upslip correction left     Grade V Long axis manipulation of L hip  Performed                  Neuro Re-ed         Hip burners  HEP 2*10 5 sec hold       Clamshells HEP 2*10 BTB+ reverse 15x 25 L, 15 R GTB      Bridge  HEP 2*10 with pilates ring, ring with SL ext, with iso er 5 x 10 sec  SL + 25# 2*15 SL + 25# 2*15     Quad set   Lateral walks 15' x2,  monster walks, reverse skater BTB lateral walks 15' x2,  monster walks, reverse skater BTB          V 90# LP 2*10     TherEx         NuStep warmup    Sliders abd, 45 deg, ext 2*10 b/l     Squats  TRX 15x + 10 TRX 25x- left leg forward TRX with iso ER 25x     Side stepping with loop         HR  Leg press 95, 105, 115, 125# 15x ea leg press 95, 105, 115, 125# 15x ea Seated piriformis stretch 3*20 sec b/l      SLR          LAQ   70# RDL 10x With ER 4# 3 sec hold     SAQ   10 RDL LLE 12' step up 20# 2*10 b/l         Standing clamshell GTB 25 L, 15 R                                                           TherAct         Patient education                                             Gait Training                                    Modalities         CP         Access Code: [email or web address]  URL: [email or web address]  Date: 12/03/2024  Prepared by: Gladis Nobles-Esteban    Exercises  - Side Stepping with Resistance at Thighs  - 1 x daily - 7 x weekly - 3 sets - 10 reps  - Forward Monster Walk with Resistance (BKA)  - 1 x daily - 7 x weekly - 3 sets - 10 reps  - Backward Monster Walks  - 1 x daily - 7 x weekly - 3 sets - 10 reps

## 2024-12-23 ENCOUNTER — OFFICE VISIT (OUTPATIENT)
Dept: PHYSICAL THERAPY | Facility: CLINIC | Age: 24
End: 2024-12-23
Payer: COMMERCIAL

## 2024-12-23 DIAGNOSIS — M25.552 LEFT HIP PAIN: Primary | ICD-10-CM

## 2024-12-23 PROCEDURE — 97112 NEUROMUSCULAR REEDUCATION: CPT

## 2024-12-23 PROCEDURE — 97110 THERAPEUTIC EXERCISES: CPT

## 2024-12-23 NOTE — PROGRESS NOTES
Daily Note     Today's date: 2024  Patient name: Nupur Coles  : 2000  MRN: [id number]  Referring provider: Lombardi, Frank, DO  Dx:   Encounter Diagnosis     ICD-10-CM    1. Left hip pain  M25.552           Start Time: 1500  Stop Time: 1545  Total time in clinic (min): 45 minutes    Subjective: Reports that she is better able to walk without turning her right leg out      Objective: See treatment diary below      Assessment: Tolerated treatment well. Patient would benefit from continued PT in order to strengthen internal rotaters. Did well with addition of sled push - pull. Very challenged by pistol squat. Fatigued by end of session.       Plan: Continue per plan of care.        Insurance Eval/ Re-eval POC expires Auth Status Total visits  Start date  Expiration date Misc    24  60                    Precautions: anxiety  Past Medical History:   Diagnosis Date    Complication of left ear piercing     last assessed: 17    Exposure to blood or body fluid     last assessed: 17    Moderate major depression (HCC)     last assessed: 17         Date 2024 12/3/24  12/16/24 12/23/24    Visit Number IE 2  4 FOTO 5    Auth                  Manual         Posterior thrust of L hip  Performed    Gr V upslip correction left     Grade V Long axis manipulation of L hip  Performed                  Neuro Re-ed         Hip burners  HEP 2*10 5 sec hold   Sled push- pull 50' 3x    Clamshells HEP 2*10 BTB+ reverse 15x 25 L, 15 R GTB      Bridge  HEP 2*10 with pilates ring, ring with SL ext, with iso er 5 x 10 sec  SL + 25# 2*15 SL + 25# 2*15 SL + 25# 2*15    Quad set   Lateral walks 15' x2,  monster walks, reverse skater BTB lateral walks 15' x2,  monster walks, reverse skater BTB  Penguin walks with black X band +lateral walks 10 steps        V 90# LP 2*10 V 115, 125,135 x 10    TherEx         NuStep warmup    Sliders abd, 45 deg, ext 2*10 b/l Sliders abd, 45 deg, ext 2*10 b/l +9#     Squats  TRX 15x + 10 TRX 25x- left leg forward TRX with iso ER 25x TRX with iso ER 25x    Side stepping with loop     TRX supported pistol squat 10x    HR  Leg press 95, 105, 115, 125# 15x ea leg press 95, 105, 115, 125# 15x ea Seated piriformis stretch 3*20 sec b/l Seated piriformis stretch 3*20 sec b/l    SLR          LAQ   70# RDL 10x With ER 4# 3 sec hold Hip burners 2*10 5 sec hold b/l    SAQ   10 RDL LLE 12' step up 20# 2*10 b/l         Standing clamshell GTB 25 L, 15 R                                                           TherAct         Patient education                                             Gait Training                                    Modalities         CP         Access Code: [email or web address]  URL: [email or web address]  Date: 12/03/2024  Prepared by: Gladis Nobles-Esteban    Exercises  - Side Stepping with Resistance at Thighs  - 1 x daily - 7 x weekly - 3 sets - 10 reps  - Forward Monster Walk with Resistance (BKA)  - 1 x daily - 7 x weekly - 3 sets - 10 reps  - Backward Monster Walks  - 1 x daily - 7 x weekly - 3 sets - 10 reps

## 2024-12-28 DIAGNOSIS — Z30.41 ORAL CONTRACEPTIVE PILL SURVEILLANCE: ICD-10-CM

## 2024-12-30 ENCOUNTER — OFFICE VISIT (OUTPATIENT)
Dept: PHYSICAL THERAPY | Facility: CLINIC | Age: 24
End: 2024-12-30
Payer: COMMERCIAL

## 2024-12-30 DIAGNOSIS — M25.552 LEFT HIP PAIN: Primary | ICD-10-CM

## 2024-12-30 PROCEDURE — 97112 NEUROMUSCULAR REEDUCATION: CPT

## 2024-12-30 PROCEDURE — 97110 THERAPEUTIC EXERCISES: CPT

## 2024-12-30 RX ORDER — NORETHINDRONE 0.35 MG/1
1 TABLET ORAL DAILY
Qty: 84 TABLET | Refills: 1 | Status: SHIPPED | OUTPATIENT
Start: 2024-12-30

## 2024-12-30 NOTE — PROGRESS NOTES
"Daily Note     Today's date: 2024  Patient name: Nupur Coles  : 2000  MRN: [id number]  Referring provider: Lombardi, Frank, DO  Dx:   Encounter Diagnosis     ICD-10-CM    1. Left hip pain  M25.552                      Subjective: Was sore from doing single leg squat      Objective: See treatment diary below      Assessment: Tolerated treatment well. Patient would benefit from continued PT in order to strengthen left glutes, quad and hip. Focus today on loading left TFL and glutes. Did well with \"rainbows\" over pilates ring. Fatigued by single leg bridges. No increased pain during session today.       Plan: Continue per plan of care.        Insurance Eval/ Re-eval POC expires Auth Status Total visits  Start date  Expiration date Misc    24  60                    Precautions: anxiety  Past Medical History:   Diagnosis Date    Complication of left ear piercing     last assessed: 17    Exposure to blood or body fluid     last assessed: 17    Moderate major depression (HCC)     last assessed: 17         Date 2024 12/3/24  12/16/24 12/23/24 12/30/24   Visit Number IE 2  4 FOTO 5 6   Auth                  Manual         Posterior thrust of L hip  Performed    Gr V upslip correction left     Grade V Long axis manipulation of L hip  Performed                  Neuro Re-ed         Hip burners  HEP 2*10 5 sec hold   Sled push- pull 50' 3x    Clamshells HEP 2*10 BTB+ reverse 15x 25 L, 15 R GTB      Bridge  HEP 2*10 with pilates ring, ring with SL ext, with iso er 5 x 10 sec  SL + 25# 2*15 SL + 25# 2*15 SL + 25# 2*15 SL +10# 15R, 25 L  2*10 RI with towel   Quad set   Lateral walks 15' x2,  monster walks, reverse skater BTB lateral walks 15' x2,  monster walks, reverse skater BTB  Penguin walks with black X band +lateral walks 10 steps lateral walks 15' x2,  monster walks, reverse skater BTB       V 90# LP 2*10 V 115, 125,135 x 10 V 115, 125,135 x 10   TherEx         NuStep " warmup    Sliders abd, 45 deg, ext 2*10 b/l Sliders abd, 45 deg, ext 2*10 b/l +9#    Squats  TRX 15x + 10 TRX 25x- left leg forward TRX with iso ER 25x TRX with iso ER 25x    Side stepping with loop     TRX supported pistol squat 10x TRX split squat R 25x b/l, L 15x   HR  Leg press 95, 105, 115, 125# 15x ea leg press 95, 105, 115, 125# 15x ea Seated piriformis stretch 3*20 sec b/l Seated piriformis stretch 3*20 sec b/l    SLR          LAQ   70# RDL 10x With ER 4# 3 sec hold Hip burners 2*10 5 sec hold b/l Hip burners 2*10 5 sec hold b/l   SAQ   10 RDL LLE 12' step up 20# 2*10 b/l         Standing clamshell GTB 25 L, 15 R  Standing clamshell GTB 25 L, 15 R                                                         TherAct         Patient education                                             Gait Training                                    Modalities         CP         Access Code: [email or web address]  URL: [email or web address]  Date: 12/03/2024  Prepared by: Gladis Nobles-Esteban    Exercises  - Side Stepping with Resistance at Thighs  - 1 x daily - 7 x weekly - 3 sets - 10 reps  - Forward Monster Walk with Resistance (BKA)  - 1 x daily - 7 x weekly - 3 sets - 10 reps  - Backward Monster Walks  - 1 x daily - 7 x weekly - 3 sets - 10 reps

## 2025-01-13 ENCOUNTER — OFFICE VISIT (OUTPATIENT)
Dept: PHYSICAL THERAPY | Facility: CLINIC | Age: 25
End: 2025-01-13
Payer: COMMERCIAL

## 2025-01-13 DIAGNOSIS — M25.552 LEFT HIP PAIN: Primary | ICD-10-CM

## 2025-01-13 PROCEDURE — 97112 NEUROMUSCULAR REEDUCATION: CPT

## 2025-01-13 PROCEDURE — 97110 THERAPEUTIC EXERCISES: CPT

## 2025-01-13 NOTE — PROGRESS NOTES
"Daily Note     Today's date: 2025  Patient name: Nupur Coles  : 2000  MRN: [id number]  Referring provider: Lombardi, Frank, DO  Dx:   Encounter Diagnosis     ICD-10-CM    1. Left hip pain  M25.552           Start Time: 1500  Stop Time: 1545  Total time in clinic (min): 45 minutes    Subjective: Reports some pain in knee when doing TRX squats      Objective: See treatment diary below      Assessment: Tolerated treatment well. Patient would benefit from continued PT in order to continue strengthening quads, hips and glutes. Is making slow but steady progress toward goals. Some external rotation of LLE during ambulation likely secondary to helen placement from broken femur. Challenged by 18 \" step up with weight.       Plan: Progress note during next visit.        Insurance Eval/ Re-eval POC expires Auth Status Total visits  Start date  Expiration date Misc    24  60                    Precautions: anxiety  Past Medical History:   Diagnosis Date    Complication of left ear piercing     last assessed: 17    Exposure to blood or body fluid     last assessed: 17    Moderate major depression (HCC)     last assessed: 17         Date 2024 12/3/24  12/16/24 12/23/24 12/30/24 1/13/25   Visit Number IE 2  4 FOTO 5 6 1   Auth                    Manual          Posterior thrust of L hip  Performed    Gr V upslip correction left      Grade V Long axis manipulation of L hip  Performed       Hip mobs with belt             Neuro Re-ed          Hip burners  HEP 2*10 5 sec hold   Sled push- pull 50' 3x     Clamshells HEP 2*10 BTB+ reverse 15x 25 L, 15 R GTB       Bridge  HEP 2*10 with pilates ring, ring with SL ext, with iso er 5 x 10 sec  SL + 25# 2*15 SL + 25# 2*15 SL + 25# 2*15 SL +10# 15R, 25 L  2*10 RI with towel    Quad set   Lateral walks 15' x2,  monster walks, reverse skater BTB lateral walks 15' x2,  monster walks, reverse skater BTB  Penguin walks with black X band " +lateral walks 10 steps lateral walks 15' x2,  monster walks, reverse skater BTB Resisted hip opener 25 l, 15 R gtb       V 90# LP 2*10 V 115, 125,135 x 10 V 115, 125,135 x 10    TherEx          NuStep warmup    Sliders abd, 45 deg, ext 2*10 b/l Sliders abd, 45 deg, ext 2*10 b/l +9#  Sliders abd, 45 deg, ext 2*10 b/l +9#   Squats  TRX 15x + 10 TRX 25x- left leg forward TRX with iso ER 25x TRX with iso ER 25x     Side stepping with loop     TRX supported pistol squat 10x TRX split squat R 25x b/l, L 15x lateral walks 15' x2,  monster walks, reverse skater BTB   HR  Leg press 95, 105, 115, 125# 15x ea leg press 95, 105, 115, 125# 15x ea Seated piriformis stretch 3*20 sec b/l Seated piriformis stretch 3*20 sec b/l  Step up with 10# 15x   SLR           LAQ   70# RDL 10x With ER 4# 3 sec hold Hip burners 2*10 5 sec hold b/l Hip burners 2*10 5 sec hold b/l Hip burners 2*10 5 sec hold b/l   SAQ   10 RDL LLE 12' step up 20# 2*10 b/l   Bridge with single leg drip out blue loop 15x       Standing clamshell GTB 25 L, 15 R  Standing clamshell GTB 25 L, 15 R                                                                TherAct          Patient education                                                  Gait Training                                        Modalities          CP          Access Code: [email or web address]  URL: [email or web address]  Date: 12/03/2024  Prepared by: Gladis Nobles-Esteban    Exercises  - Side Stepping with Resistance at Thighs  - 1 x daily - 7 x weekly - 3 sets - 10 reps  - Forward Monster Walk with Resistance (BKA)  - 1 x daily - 7 x weekly - 3 sets - 10 reps  - Backward Monster Walks  - 1 x daily - 7 x weekly - 3 sets - 10 reps

## 2025-01-20 ENCOUNTER — OFFICE VISIT (OUTPATIENT)
Dept: PHYSICAL THERAPY | Facility: CLINIC | Age: 25
End: 2025-01-20
Payer: COMMERCIAL

## 2025-01-20 DIAGNOSIS — M25.552 LEFT HIP PAIN: Primary | ICD-10-CM

## 2025-01-20 PROCEDURE — 97112 NEUROMUSCULAR REEDUCATION: CPT

## 2025-01-20 PROCEDURE — 97110 THERAPEUTIC EXERCISES: CPT

## 2025-01-20 NOTE — PROGRESS NOTES
Daily Note     Today's date: 2025  Patient name: Nupur Coles  : 2000  MRN: [id number]  Referring provider: Lombardi, Frank, DO  Dx:   Encounter Diagnosis     ICD-10-CM    1. Left hip pain  M25.552           Start Time: 1630  Stop Time: 1715  Total time in clinic (min): 45 minutes    Subjective: No pain in left hip today      Objective: See treatment diary below      Assessment: Tolerated treatment well. Patient would benefit from continued PT in order to build quad, hip and glute strength. Nupur is making slow but steady progress. Continue to strengthen. Limited mobility in left ankle which improved with mobs with therapist OP on step.       Plan: Continue per plan of care.  Re-eval next session       Insurance Eval/ Re-eval POC expires Auth Status Total visits  Start date  Expiration date Misc    24  60                    Precautions: anxiety  Past Medical History:   Diagnosis Date    Complication of left ear piercing     last assessed: 17    Exposure to blood or body fluid     last assessed: 17    Moderate major depression (HCC)     last assessed: 17         Date  24   Visit Number  4 FOTO 5 6 1 2   Auth                  Manual         Posterior thrust of L hip   Gr V upslip correction left       Grade V Long axis manipulation of L hip      Hip mobs with belt             Neuro Re-ed         Hip burners    Sled push- pull 50' 3x      Clamshells 25 L, 15 R GTB        Bridge  SL + 25# 2*15 SL + 25# 2*15 SL + 25# 2*15 SL +10# 15R, 25 L  2*10 RI with towel  Bridge hip drop 25, 15 red loop     Quad set  lateral walks 15' x2,  monster walks, reverse skater BTB  Penguin walks with black X band +lateral walks 10 steps lateral walks 15' x2,  monster walks, reverse skater BTB Resisted hip opener 25 l, 15 R gtb Clam shell red loop 25, 15 R     V 90# LP 2*10 V 115, 125,135 x 10 V 115, 125,135 x 10  Staggered sit to stand with 20# 3*10    TherEx         NuStep warmup  Sliders abd, 45 deg, ext 2*10 b/l Sliders abd, 45 deg, ext 2*10 b/l +9#  Sliders abd, 45 deg, ext 2*10 b/l +9# Sliders abd, 45 deg, ext 2*10 b/l +9#   Squats TRX 25x- left leg forward TRX with iso ER 25x TRX with iso ER 25x      Side stepping with loop   TRX supported pistol squat 10x TRX split squat R 25x b/l, L 15x lateral walks 15' x2,  monster walks, reverse skater BTB Alt walking lunge with twist 02' x 4 10# + repeat reverse lunge   HR leg press 95, 105, 115, 125# 15x ea Seated piriformis stretch 3*20 sec b/l Seated piriformis stretch 3*20 sec b/l  Step up with 10# 15x Step up with 10# 15x   SLR          LAQ 70# RDL 10x With ER 4# 3 sec hold Hip burners 2*10 5 sec hold b/l Hip burners 2*10 5 sec hold b/l Hip burners 2*10 5 sec hold b/l Hip burners 2*10 5 sec hold b/l   SAQ 10 RDL LLE 12' step up 20# 2*10 b/l   Bridge with single leg drip out blue loop 15x Dead lifts 20#     Standing clamshell GTB 25 L, 15 R  Standing clamshell GTB 25 L, 15 R  Alt knee drive off BOSU 20x ea         Hops backward, sprint forward 4x         Ankle mobility on step                                       TherAct         Patient education                                             Gait Training                                    Modalities         CP         Access Code: [email or web address]  URL: [email or web address]  Date: 12/03/2024  Prepared by: Gladis Nobles-Esteban    Exercises  - Side Stepping with Resistance at Thighs  - 1 x daily - 7 x weekly - 3 sets - 10 reps  - Forward Monster Walk with Resistance (BKA)  - 1 x daily - 7 x weekly - 3 sets - 10 reps  - Backward Monster Walks  - 1 x daily - 7 x weekly - 3 sets - 10 reps

## 2025-01-23 ENCOUNTER — OFFICE VISIT (OUTPATIENT)
Dept: SLEEP CENTER | Facility: CLINIC | Age: 25
End: 2025-01-23
Payer: COMMERCIAL

## 2025-01-23 VITALS
BODY MASS INDEX: 23.07 KG/M2 | SYSTOLIC BLOOD PRESSURE: 118 MMHG | DIASTOLIC BLOOD PRESSURE: 70 MMHG | OXYGEN SATURATION: 96 % | HEIGHT: 67 IN | HEART RATE: 53 BPM | WEIGHT: 147 LBS

## 2025-01-23 DIAGNOSIS — J30.9 ALLERGIC RHINITIS, UNSPECIFIED SEASONALITY, UNSPECIFIED TRIGGER: ICD-10-CM

## 2025-01-23 DIAGNOSIS — F41.1 GENERALIZED ANXIETY DISORDER: ICD-10-CM

## 2025-01-23 DIAGNOSIS — R06.83 SNORING: Primary | ICD-10-CM

## 2025-01-23 DIAGNOSIS — R06.00 PND (PAROXYSMAL NOCTURNAL DYSPNEA): ICD-10-CM

## 2025-01-23 PROCEDURE — 99204 OFFICE O/P NEW MOD 45 MIN: CPT | Performed by: STUDENT IN AN ORGANIZED HEALTH CARE EDUCATION/TRAINING PROGRAM

## 2025-01-23 NOTE — ASSESSMENT & PLAN NOTE
We reviewed the association between sleep, mood, and coexisting psychiatric disorders. We discussed the importance of obtaining adequate sleep of at least 7 hours nightly. Patient was encouraged to continue current prescribed medications and to continue follow up with their PCP/psychiatrist for further management.

## 2025-01-23 NOTE — PROGRESS NOTES
Name: Nupur Coles      : 2000      MRN: [id number]  Encounter Provider: Bradley Tracey MD  Encounter Date: 2025   Encounter department: [facility]    :  Assessment & Plan  Snoring  Snoring / Concern for Obstructive Sleep Apnea - Discussed pathophysiology of WILSON, consequences of untreated WILSON and treatment options including PAP therapy, mandibular advancement device, positional therapy, and surgical referral. - Discussed risks of sleepy driving and mitigation strategies (napping). Patient agrees to not drive if tired or sleepy. - Advised avoidance of alcohol and centrally acting medications as these can worsen WILSON.  -Patient with symptoms of snoring, choking, gasping, as well as unrefreshing sleep quality.  Her constellation of signs and symptoms may be suggestive of undiagnosed sleep disordered breathing.  I have placed an order for home sleep study today in the office.  - I have asked the patient to return to the office after sleep testing is completed to review results and treatment options together.  Orders:    Home Study; Future    PND (paroxysmal nocturnal dyspnea)  As noted by her primary care physician and placement of consult order to be seen in the office today.  Orders:    Ambulatory referral to Sleep Medicine    Allergic rhinitis, unspecified seasonality, unspecified trigger  Patient with reported history of regular nasal sinus congestion.  She is also concerned about possible presence of a deviated nasal septum.  On exam it does appear that she has a deviated septum blocking off the left nare.  We reviewed the importance of the nose and nasal breathing on sleep quality.  I have placed an order for ENT evaluation.  I have encouraged the patient to control nasal sinus congestion and reviewed impacts on sleep.  Orders:    Ambulatory Referral to Otolaryngology; Future    Generalized anxiety disorder  We reviewed the association between sleep, mood, and  "coexisting psychiatric disorders. We discussed the importance of obtaining adequate sleep of at least 7 hours nightly. Patient was encouraged to continue current prescribed medications and to continue follow up with their PCP/psychiatrist for further management.            History of Present Illness       Pertinent positives/negatives included in HPI and also as noted:     Objective   /70   Pulse (!) 53   Ht 5' 7\" (1.702 m)   Wt 66.7 kg (147 lb)   SpO2 96%   BMI 23.02 kg/m²     Neck Circumference: 12.5  [facility] New Patient Evaluation    Ms. Coles is a 24 y.o. female with a PMH of ADHD, who presents as a new patient for evaluation of sleep disordered breathing.     I have reviewed the 11/15/2024 family medicine office note with PARDEEP Edgar.    I have reviewed the 7/15/2024 family medicine office note with Frank Lombardi, DO.    History of Presenting Illness:    The patient snores. There are choking and gasping episodes. There are no witnessed apneas. Typically 0 episode(s) of nocturia occur per night. The patient sleeps on her stomach. She sleeps with one pillow. There are reports of sleep talking/mumbling. Otherwise no other nocturnal behaviors.    The patient's Alvin sleepiness scale score is 4/24 (<=10 is normal). She has not been sleepy while driving. She has not had a fall-asleep motor vehicle accident. There are no reports of hypnagogic hallucinations, or cataplexy.    She reports a history of sleep paralysis. She reports this occurs a few times monthly.      In terms of the patient's sleep/wake cycle symptoms:  Bedtime: 5:30-6:30pm  SOL: 1-2 Hours   Nocturnal awakenings: 3-4x, Tossing and Turning, Restlessness  Wakeup time: 2:00am   Naps: Denied    Total sleep time estimate: Approximately 6-7 hours.     She has not tried any medications for poor sleep in the past.    Her legs do not bother her on trying to initiate sleep.    She is on Adderall for history of ADHD. " Last took 1 month ago, around 12:30pm. She tells me she is no longer taking regularly. She notes that it gave her a panic attack.     Past Medical History:   Diagnosis Date    Complication of left ear piercing     last assessed: 09/11/17    Exposure to blood or body fluid     last assessed: 01/31/17    Moderate major depression (HCC)     last assessed: 07/25/17        Past Surgical History:   Procedure Laterality Date    FRACTURE SURGERY Left     femur      No prior upper airway surgeries.     Allergies   Allergen Reactions    Clarithromycin         Current Outpatient Medications on File Prior to Visit   Medication Sig Dispense Refill    amphetamine-dextroamphetamine (ADDERALL, 20MG,) 20 mg tablet Take 1 tablet (20 mg total) by mouth 2 (two) times a day Max Daily Amount: 40 mg 60 tablet 0    fluticasone (FLONASE) 50 mcg/act nasal spray 2 sprays into each nostril daily 48 g 1    Jencycla 0.35 MG tablet TAKE 1 TABLET BY MOUTH EVERY DAY 84 tablet 1     No current facility-administered medications on file prior to visit.       Family History: Her family history includes Endometriosis in her maternal aunt; Gout in her father; Hypertension in her father; Other in her mother; Seasonal affective disorder in her mother.    Father with reported sleep problems and significant snoring and is a light sleeper.    Social History:   Job: Works 3am - 12:00 noon. (Will be switching from 3pm - 11pm). Also a student for a histology tech.   Caffeine: 24-32 oz of Coffee Daily  Alcohol: Drink Alcohol 2x Monthly  Drugs: Denied  Tobacco: Denied  Vape: Denied  Exercise: Weight Training, Lots of Walking and Running     Patient's medications, allergies, past medical, surgical, social and family histories were reviewed in the electronic medical record and updated as appropriate.    Review of Systems: On review of systems, the patient reports: Occasional headaches on waking, occasional nasal sinus congestion, denied waking up with dry mouth and  "dry throat.     Vitals:    01/23/25 1300   BP: 118/70   Pulse: (!) 53   SpO2: 96%       Physical Examination:  Gen: No acute distress, not visibly anxious, speaking comfortably  H&N: MM III; no retro/micrognathia; no macroglossia; no visible thyromegaly  Neuro: Alert and oriented x3, interactive  Psych: Pleasant, normal affect  Skin: No visible rashes  Respiratory: No accessory muscle use, breathing comfortably  Cardiac: No visible edema of extremities  Abdomen: Soft, NT, non distended  Musculoskeletal: Normal ROM Intact of Extremities    Study Results:  I reviewed the following labs:    No results found for: \"FERRITIN\"    Most recent WBC of 5.13, normal hemoglobin of 12.0.  Normal platelets of 199.    Lab Results   Component Value Date    WBC 5.13 05/15/2024    HGB 12.0 05/15/2024    HCT 36.6 05/15/2024    MCV 87 05/15/2024     05/15/2024       This SmartLink has not been configured with any valid records.       Lab Results   Component Value Date    SODIUM 136 05/15/2024    K 3.5 05/15/2024     05/15/2024    CO2 24 05/15/2024    BUN 11 05/15/2024    CREATININE 0.71 05/15/2024    GLUC 96 05/15/2024    CALCIUM 9.4 05/15/2024       This SmartLink has not been configured with any valid records.       Lab Results   Component Value Date    TSH 2.33 05/29/2024          Results/Data:  I have reviewed the results and report from the 9/3/2019 transthoracic echocardiogram: LEFT VENTRICLE:  Systolic function was normal. Ejection fraction was estimated in the range of 60 % to 65 % to be 65 %.  There were no regional wall motion abnormalities.    Independent Findings Reviewed: Normal left ventricular ejection fraction, no wall motion abnormalities.    I have reviewed the results and report from the 5/15/2024 CT head without contrast.    Independent Findings Reviewed: No acute intracranial findings.    I answered the patient's questions to the best of my ability. We will continue with longitudinal follow-up for " evaluation of sleep disordered breathing. Follow-up will be after sleep testing is completed.    Bradley Tracey MD  Sleep Medicine and Internal Medicine  [facility]  01/23/25

## 2025-01-23 NOTE — ASSESSMENT & PLAN NOTE
As noted by her primary care physician and placement of consult order to be seen in the office today.  Orders:    Ambulatory referral to Sleep Medicine

## 2025-01-23 NOTE — PATIENT INSTRUCTIONS
"- A sleep study was ordered for you. It can be an in lab sleep study or a portable at home sleep study. It depends on what insurance approves.  Some insurances will only cover home sleep studies unless you have significant medical conditions like stroke or heart attack within the last 6 months, severe COPD, or the use of supplemental oxygen.    - The order goes electronically to the sleep center staff.    - The sleep center will get approval from your insurance and then will call you to schedule the sleep study.    - If you do not hear from the sleep center in 1 week, please call us to check the status of your order.    - There are different locations to get sleep study done.    - Please make sure you know what is the copay associated with your sleep study. Approval does not mean coverage.     - Once your sleep study is done, it can take up to 2 weeks to get results (depending on the volume).    - A follow up appointment to discuss sleep study results is required in most cases. On that visit, we will discuss results and treatment options.    - If your sleep study shows severe sleep apnea please expect contact from the sleep center. We will try to expedite your follow up appointment.    - The best way to communicate with us in through [facility]. Make sure your account is active.    - Once you start CPAP therapy, it is mandatory by insurance, to have a follow up appointment with us within 31-90 days of getting CPAP.     Patient Education     Sleep apnea in adults   The Basics   Written by the doctors and editors at [facility]   What is sleep apnea? -- Sleep apnea is a condition that makes you stop breathing for short periods while you are asleep. There are 2 types of sleep apnea. One is called \"obstructive sleep apnea.\" The other is called \"central sleep apnea.\"  In obstructive sleep apnea, you stop breathing because your throat narrows or closes (figure 1). In central sleep apnea, you stop breathing because your " "brain does not send the right signals to your muscles to make you breathe. When people talk about sleep apnea, they are usually referring to obstructive sleep apnea, which is what this article is about.  People with sleep apnea do not know that they stop breathing when they are asleep. But they do sometimes wake up startled or gasping for breath. They also often hear from loved ones that they snore.  What are the symptoms of sleep apnea? -- The main symptoms of sleep apnea are loud snoring, tiredness, and daytime sleepiness. Other symptoms can include:   Restless sleep   Waking up choking or gasping   Morning headaches, dry mouth, or sore throat   Waking up often to urinate   Waking up feeling unrested or groggy   Trouble thinking clearly or remembering things  Some people with sleep apnea don't have symptoms, or don't realize that they have them.  Should I see a doctor or nurse? -- Yes. If you think that you might have sleep apnea, see your doctor.  Is there a test for sleep apnea? -- Yes. First, your doctor or nurse will ask about your symptoms. If you have a bed partner, they might also ask that person if you snore or gasp in your sleep. If the doctor or nurse suspects that you have sleep apnea, they might send you for a \"sleep study.\"  Sleep studies can sometimes be done at home, but they are usually done in a sleep lab. For the study, you spend the night in the lab, and you are hooked up to different machines that monitor your heart rate, breathing, and other body functions. The results of the test tell your doctor or nurse if you have the disorder.  Is there anything I can do on my own to help my sleep apnea? -- Yes. Some things that might help:   Try to avoid sleeping on your back, if possible. This might help some people.   Lose weight, if you have excess body weight.   Get regular physical activity. This might help you lose weight. But even if it doesn't, being active is good for your health.   Avoid " "alcohol, especially in the evening. Alcohol can make sleep apnea worse.  How is sleep apnea treated? -- Treatment can include:   Weight loss - As mentioned above, weight loss can help if you have excess weight or obesity. But losing weight can be challenging, and it takes time to lose enough weight to help with your sleep apnea. Most people need other treatment while they work on losing weight.   CPAP - The most effective treatment for sleep apnea is a device that keeps your airway open while you sleep. Treatment with this device is called \"continuous positive airway pressure\" (\"CPAP\"). People getting CPAP wear a face mask at night that keeps them breathing (figure 2).  If your doctor or nurse recommends a CPAP machine, be patient about using it. The mask might seem uncomfortable to wear at first, and the machine might seem noisy, but using the machine can really help you. People with sleep apnea who use a CPAP machine feel more rested and generally feel better.  If CPAP does not work, your doctor might suggest other treatment. Options might include:   An oral device - This is a device that you wear in your mouth. It is called an \"oral appliance\" or \"mandibular advancement device.\" It helps keep your airway open while you sleep.   Hypoglossal nerve stimulation - This involves a procedure to implant a small device into your chest. The device has a wire that connects to the nerve under your tongue. While you are sleeping, it sends an electrical signal that causes the tongue to push forward. This helps open up your airway.   Surgery to widen your airway - This might involve removing your tonsils or other tissue that blocks the airway.  Is sleep apnea dangerous? -- It can be. Risks include:   Accidents - People with sleep apnea do not get good-quality sleep, so they are often tired and not alert. This puts them at risk for car accidents and other types of accidents.   Other health problems - Studies show that people " with sleep apnea are more likely than others to have high blood pressure, heart attacks, and other serious heart problems. Some people also have mood changes or depression.  In people with severe sleep apnea, getting treated (for example, with CPAP) can help lower these risks.  All topics are updated as new evidence becomes available and our peer review process is complete.  This topic retrieved from [employer] on: Feb 28, 2024.  Topic 46388 Version 18.0  Release: 32.2.4 - C32.58  © 2024 UpToDate, Inc. and/or its affiliates. All rights reserved.  figure 1: Airway in a person with sleep apnea     Normally, when a person sleeps, the airway remains open, and air can pass from the nose and mouth to the lungs. In a person with sleep apnea, parts of the throat and mouth drop into the airway and block off the flow of air. This can cause loud snoring and interrupt breathing for short periods.  Graphic 73982 Version 6.0  figure 2: Continuous positive airway pressure (CPAP) for sleep apnea     The CPAP mask gently blows air into your nose while you sleep. It puts just enough pressure on your airway to keep it from closing. The mask in this picture fits over just the nose. Other CPAP devices have masks that fit over the nose and mouth.  Graphic 55935 Version 5.0  Consumer Information Use and Disclaimer   Disclaimer: This generalized information is a limited summary of diagnosis, treatment, and/or medication information. It is not meant to be comprehensive and should be used as a tool to help the user understand and/or assess potential diagnostic and treatment options. It does NOT include all information about conditions, treatments, medications, side effects, or risks that may apply to a specific patient. It is not intended to be medical advice or a substitute for the medical advice, diagnosis, or treatment of a health care provider based on the health care provider's examination and assessment of a patient's specific and unique  circumstances. Patients must speak with a health care provider for complete information about their health, medical questions, and treatment options, including any risks or benefits regarding use of medications. This information does not endorse any treatments or medications as safe, effective, or approved for treating a specific patient. UpToDate, Inc. and its affiliates disclaim any warranty or liability relating to this information or the use thereof.The use of this information is governed by the Terms of Use, available at https://www.[employer].com/en/know/clinical-effectiveness-terms. 2024© UpToDate, Inc. and its affiliates and/or licensors. All rights reserved.  Copyright   © 2024 UpToDate, Inc. and/or its affiliates. All rights reserved.

## 2025-01-31 ENCOUNTER — OFFICE VISIT (OUTPATIENT)
Dept: PHYSICAL THERAPY | Facility: CLINIC | Age: 25
End: 2025-01-31
Payer: COMMERCIAL

## 2025-01-31 DIAGNOSIS — M25.552 LEFT HIP PAIN: Primary | ICD-10-CM

## 2025-01-31 PROCEDURE — 97112 NEUROMUSCULAR REEDUCATION: CPT

## 2025-01-31 PROCEDURE — 97110 THERAPEUTIC EXERCISES: CPT

## 2025-01-31 NOTE — PROGRESS NOTES
Daily Note     Today's date: 2025  Patient name: Nupur Coles  : 2000  MRN: [id number]  Referring provider: Lombardi, Frank, DO  Dx:   Encounter Diagnosis     ICD-10-CM    1. Left hip pain  M25.552                      Subjective: Reports some popping in left hip, started a new job so not as much exercise this week      Objective: See treatment diary below      Assessment: Tolerated treatment well. Patient would benefit from continued PT in order to strengthen left quad and glutes. Did well with addition of staggered sit to stand to bias left lower extremity. Is making slow but steady progress toward stabilizing left hip. Continue to progress strengthening. Next session sled push.       Plan: Continue per plan of care.        Insurance Eval/ Re-eval POC expires Auth Status Total visits  Start date  Expiration date Misc    24  60                    Precautions: anxiety  Past Medical History:   Diagnosis Date    Complication of left ear piercing     last assessed: 17    Exposure to blood or body fluid     last assessed: 17    Moderate major depression (HCC)     last assessed: 17         Date 24   Visit Number FOTO 5 6 1 2 3   Auth                Manual        Posterior thrust of L hip         Grade V Long axis manipulation of L hip    Hip mobs with belt             Neuro Re-ed        Hip burners  Sled push- pull 50' 3x       Clamshells        Bridge  SL + 25# 2*15 SL +10# 15R, 25 L  2*10 RI with towel  Bridge hip drop 25, 15 red loop      Quad set  Penguin walks with black X band +lateral walks 10 steps lateral walks 15' x2,  monster walks, reverse skater BTB Resisted hip opener 25 l, 15 R gtb Clam shell red loop 25, 15 R Clam shell red loop 25, 15 R raised onto forearm    V 115, 125,135 x 10 V 115, 125,135 x 10  Staggered sit to stand with 20# 3*10 Staggered sit to stand with 20# 3*10   TherEx        NuStep warmup Sliders abd, 45  deg, ext 2*10 b/l +9#  Sliders abd, 45 deg, ext 2*10 b/l +9# Sliders abd, 45 deg, ext 2*10 b/l +9#    Squats TRX with iso ER 25x       Side stepping with loop TRX supported pistol squat 10x TRX split squat R 25x b/l, L 15x lateral walks 15' x2,  monster walks, reverse skater BTB Alt walking lunge with twist 02' x 4 10# + repeat reverse lunge Resisted walks 17.5 # 10 forward, backward, sideways   HR Seated piriformis stretch 3*20 sec b/l  Step up with 10# 15x Step up with 10# 15x Walking lunges with 9# KB   SLR         LAQ Hip burners 2*10 5 sec hold b/l Hip burners 2*10 5 sec hold b/l Hip burners 2*10 5 sec hold b/l Hip burners 2*10 5 sec hold b/l    SAQ   Bridge with single leg drip out blue loop 15x Dead lifts 20#      Standing clamshell GTB 25 L, 15 R  Alt knee drive off BOSU 20x ea        Hops backward, sprint forward 4x        Ankle mobility on step                                    TherAct        Patient education                                        Gait Training                                Modalities        CP        Access Code: [email or web address]  URL: [email or web address]  Date: 12/03/2024  Prepared by: Gladis Nobles-Esteban    Exercises  - Side Stepping with Resistance at Thighs  - 1 x daily - 7 x weekly - 3 sets - 10 reps  - Forward Monster Walk with Resistance (BKA)  - 1 x daily - 7 x weekly - 3 sets - 10 reps  - Backward Monster Walks  - 1 x daily - 7 x weekly - 3 sets - 10 reps

## 2025-02-07 ENCOUNTER — OFFICE VISIT (OUTPATIENT)
Dept: PHYSICAL THERAPY | Facility: CLINIC | Age: 25
End: 2025-02-07
Payer: COMMERCIAL

## 2025-02-07 DIAGNOSIS — M25.552 LEFT HIP PAIN: Primary | ICD-10-CM

## 2025-02-07 PROCEDURE — 97112 NEUROMUSCULAR REEDUCATION: CPT

## 2025-02-07 PROCEDURE — 97110 THERAPEUTIC EXERCISES: CPT

## 2025-02-07 NOTE — PROGRESS NOTES
Daily Note     Today's date: 2025  Patient name: Nupur Coles  : 2000  MRN: [id number]  Referring provider: Lombardi, Frank, DO  Dx:   Encounter Diagnosis     ICD-10-CM    1. Left hip pain  M25.552                      Assessment  Assessment details: Patient is a 24 y.o. Female who presents to skilled outpatient PT for referring diagnoses include left hip pain. Primary movement impairment diagnosis of decreased power, muscular imbalance R > L, decreased hip IR ROM bilaterally resulting in pathoanatomical symptoms of nociceptive pain with high intensity recreational activities. Nupur has made slow but steady progress toward goals and increasing lower extremity strength. She remains a good candidate for skilled physical therapy.  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      Impairments: Abnormal or restricted ROM, Activity intolerance, Impaired physical strength, Lacks appropriate HEP, and Pain with function  Understanding of Dx/Px/POC: Good  Prognosis: Good     Patient verbalized understanding of POC. Please contact me if you have any questions or recommendations. Thank you for the referral and the opportunity to share in Nupur Coles's care.     Plan  Patient would benefit from: PT Eval and Skilled PT  Planned modality interventions: Dry needling, Biofeedback, Cryotherapy, TENS, Thermotherapy: Hydrocollator Packs, and Traction  Planned therapy interventions: Abdominal trunk stabilization, Dry Needling, Functional ROM exercises, HEP, Joint mobilization, Manual therapy, Baeza taping, Neuromuscular re-education, Patient education, Strengthening, Therapeutic activities, and Therapeutic exercises  Frequency: 2x/wk  Duration in weeks: 8  Plan of Care beginning date: 24  Plan of Care expiration date: 8 weeks - 3/30/2025  Treatment plan discussed with: Patient        Goals  Short Term Goals (4 weeks):  all met as of 25  -  Patient will be independent in basic HEP 2-3 weeks  - Patient will demonstrate knee ROM WNL for ease with gait.   - Patient will have 0/10 pain at rest  - Patient will demonstrate >1/3 improvement in MMT grade as applicable     Long Term Goals (8 weeks): progressing as of 25  - Patient will be independent in a comprehensive home exercise program  - Patient FOTO score will improve by 10 points.   - Patient will independently ambulate >1000 feet (community ambulation)  - Patient will self-report >75% improvement in function  - Patient  will be able resume lifting activities with min to no hip pain  - Patient  will be able to jog with min to no hip pain         Subjective     History of Present Illness  - Mechanism of injury: Pt broke L femur when she was 11 years old. Had a sedentary lifestyle before starting to lift at gym when she was 18/19 years old. She has noticed that she has a muscle imbalance with R > L. Currently, she is experiencing a dull, constant pain in L lateral hip.      - Functional limitations: walking after sitting for long periods working as a       - Patient goals: hip and knee pain to decrease, be able to jog        - Red flag screening:   Positive for: recent infection  Negative for: age >50 years old, history of cancer, fever, chills, night sweats, weight loss, immunosuppression, rest/night pain, saddle anesthesia, bladder dysfunction, and LE neurological deficits       Update 25: Reports a 70% improvement but still notes disparity from right to left hip when exercising. Hip feels pretty good, had some shooting pain down front of left leg while wearing platform crocs, but the pain went away.      Pain  - Current pain ratin/10  - At best pain ratin/10  - At worst pain ratin/10  - Location: lateral side of L hip   - Alleviating factors: take a day or two off from exercising        Objective      LE MMT  L Hip Flexion: 5/5             R Hip Flexion: 5/5   L Hip  Extension: 4+/5 R Hip Extension: 5/5   L Hip Abduction: 4+/5 R Hip Abduction: 5/5   L Hip Adduction: NT R Hip Adduction: NT   L Hip IR: 4+/5 with pain  R Hip IR: 5/5   L Hip ER: 5/5 R Hip ER: 5/5   L Knee Extension: 5/5 R Knee Extension: 5/5   L Knee Flexion: 5/5 R Knee Flexion: 5/5   L Ankle DF: 5/5 R Ankle DF: 5/5   L Ankle PF: NT             R Ankle PF: NT         LE ROM     L hip flexion: WNL R hip flexion: WNL   L hip extension: NT R hip extension: NT   L hip IR: limited  R hip IR: limited   L hip ER: WNL  R hip ER: WNL    L hip abduction: NT R hip abduction: NT         Special Testing L R   FABIR negative negative   FADIR positive  negative   Hip Scour  positive  negative   Thigh thrust NT NT   Flick test  NT NT   Standing flexion test  positive  negative   Prone knee flexion test NT NT    Supine to long sit test positive  negative   Neurodynamic assessment  NT NT       L innominate was observed to be anteriorly rotated, posterior thrust was performed.   Grade 5 long axis manipulation was performed due to upslip on L side.      Sensation  - Light touch: NT     Hip Comments  - Static standing foot alignment: NT  - Functional squat: NT             Insurance Eval/ Re-eval POC expires Auth Status Total visits  Start date  Expiration date Misc    11/25/24 1/20/25  60       2/7/25 3/30/25            Precautions: anxiety  Past Medical History:   Diagnosis Date    Complication of left ear piercing     last assessed: 09/11/17    Exposure to blood or body fluid     last assessed: 01/31/17    Moderate major depression (HCC)     last assessed: 07/25/17         Date 12/23/24 12/30/24 1/13/25 1/20/25 1/31/25 2/7/25   Visit Number FOTO 5 6 1 2 3 4   Auth                  Manual         Posterior thrust of L hip          Grade V Long axis manipulation of L hip    Hip mobs with belt               Neuro Re-ed         Hip burners  Sled push- pull 50' 3x        Clamshells         Bridge  SL + 25# 2*15 SL +10# 15R, 25 L  2*10 RI  "with towel  Bridge hip drop 25, 15 red loop    KB squats 3 rounds 20 sec 35#   Quad set  Penguin walks with black X band +lateral walks 10 steps lateral walks 15' x2,  monster walks, reverse skater BTB Resisted hip opener 25 l, 15 R gtb Clam shell red loop 25, 15 R Clam shell red loop 25, 15 R raised onto forearm Clam shell red loop 25, 15 R raised onto forearm    V 115, 125,135 x 10 V 115, 125,135 x 10  Staggered sit to stand with 20# 3*10 Staggered sit to stand with 20# 3*10 12\" box jumps 15 x 3+ 5 on 18\" box   TherEx         NuStep warmup Sliders abd, 45 deg, ext 2*10 b/l +9#  Sliders abd, 45 deg, ext 2*10 b/l +9# Sliders abd, 45 deg, ext 2*10 b/l +9#  20x bridges with 35#   Squats TRX with iso ER 25x        Side stepping with loop TRX supported pistol squat 10x TRX split squat R 25x b/l, L 15x lateral walks 15' x2,  monster walks, reverse skater BTB Alt walking lunge with twist 02' x 4 10# + repeat reverse lunge Resisted walks 17.5 # 10 forward, backward, sideways    HR Seated piriformis stretch 3*20 sec b/l  Step up with 10# 15x Step up with 10# 15x Walking lunges with 9# KB    SLR          LAQ Hip burners 2*10 5 sec hold b/l Hip burners 2*10 5 sec hold b/l Hip burners 2*10 5 sec hold b/l Hip burners 2*10 5 sec hold b/l     SAQ   Bridge with single leg drip out blue loop 15x Dead lifts 20#       Standing clamshell GTB 25 L, 15 R  Alt knee drive off BOSU 20x ea         Hops backward, sprint forward 4x         Ankle mobility on step                                         TherAct         Patient education      Squat mechanics                                       Gait Training                                    Modalities         CP         Access Code: [email or web address]  URL: [email or web address]  Date: 12/03/2024  Prepared by: Gladis Cohen    Exercises  - Side Stepping with Resistance at Thighs  - 1 x daily - 7 x weekly - 3 sets - 10 reps  - Forward Monster Walk with Resistance (BKA)  - 1 x daily - " 7 x weekly - 3 sets - 10 reps  - Backward Monster Walks  - 1 x daily - 7 x weekly - 3 sets - 10 reps

## 2025-02-14 ENCOUNTER — OFFICE VISIT (OUTPATIENT)
Dept: PHYSICAL THERAPY | Facility: CLINIC | Age: 25
End: 2025-02-14
Payer: COMMERCIAL

## 2025-02-14 DIAGNOSIS — M25.552 LEFT HIP PAIN: Primary | ICD-10-CM

## 2025-02-14 PROCEDURE — 97112 NEUROMUSCULAR REEDUCATION: CPT

## 2025-02-14 PROCEDURE — 97140 MANUAL THERAPY 1/> REGIONS: CPT

## 2025-02-14 NOTE — PROGRESS NOTES
Daily Note     Today's date: 2025  Patient name: Nupur Coles  : 2000  MRN: [id number]  Referring provider: Lombardi, Frank, DO  Dx:   Encounter Diagnosis     ICD-10-CM    1. Left hip pain  M25.552                      Subjective: Left hip popping sounds, left knee pain with squatting      Objective: See treatment diary below      Assessment: Tolerated treatment well. Patient would benefit from continued PT in order to down train central nervous system. Corrected form in squatting.  Pt verbally consented to dry needling treatment session. Risks/benefits/aftercare instructions reviewed. All questions/concerns addressed.  Pt in semi darling position. Hand hygiene performed pre and post DN treatment session. Placement of needles in pathological tissue.   Left knee  (needle location).  Total treatment duration to include set up/break down/in situ: 30 minutes. Patient was supervised by clinician throughout entirety of treatment session to include when DN in situ; clinician next to patient. All needles counted upon insertion and removal-- 15 needles. All accounted for and disposed in appropriate sharp container.     No adverse reaction to treatment. Pt advised may experience muscular fatigue and soreness. Pt verbalized understanding.          Plan: Continue per plan of care.        Insurance Eval/ Re-eval POC expires Auth Status Total visits  Start date  Expiration date Misc    24  60       2/7/25 3/30/25            Precautions: anxiety  Past Medical History:   Diagnosis Date    Complication of left ear piercing     last assessed: 17    Exposure to blood or body fluid     last assessed: 17    Moderate major depression (HCC)     last assessed: 17         Date 25    Visit Number 1 2 3 4 5    Auth                  Manual         Posterior thrust of L hip      DN- see above    Grade V Long axis manipulation of L hip  Hip mobs with belt          "        Neuro Re-ed         Hip burners          Clamshells         Bridge   Bridge hip drop 25, 15 red loop    KB squats 3 rounds 20 sec 35# Med ball slams 15# 2*15    Quad set  Resisted hip opener 25 l, 15 R gtb Clam shell red loop 25, 15 R Clam shell red loop 25, 15 R raised onto forearm Clam shell red loop 25, 15 R raised onto forearm KB squats 3 rounds 20 sec 35#      Staggered sit to stand with 20# 3*10 Staggered sit to stand with 20# 3*10 12\" box jumps 15 x 3+ 5 on 18\" box     TherEx         NuStep warmup Sliders abd, 45 deg, ext 2*10 b/l +9# Sliders abd, 45 deg, ext 2*10 b/l +9#  20x bridges with 35# Sliders abd, 45 deg, ext 2*10 b/l +9#    Squats         Side stepping with loop lateral walks 15' x2,  monster walks, reverse skater BTB Alt walking lunge with twist 02' x 4 10# + repeat reverse lunge Resisted walks 17.5 # 10 forward, backward, sideways  Resisted walks 17.5 # 10 forward, backward, sideways    HR Step up with 10# 15x Step up with 10# 15x Walking lunges with 9# KB  [place] carry 25# 50' x 4    SLR          LAQ Hip burners 2*10 5 sec hold b/l Hip burners 2*10 5 sec hold b/l       SAQ Bridge with single leg drip out blue loop 15x Dead lifts 20#   Dead lifts 20# 2*10      Alt knee drive off BOSU 20x ea         Hops backward, sprint forward 4x         Ankle mobility on step                                           TherAct         Patient education    Squat mechanics                                         Gait Training                                    Modalities         CP         Access Code: [email or web address]  URL: [email or web address]  Date: 12/03/2024  Prepared by: Gladis Nobles-Esteban    Exercises  - Side Stepping with Resistance at Thighs  - 1 x daily - 7 x weekly - 3 sets - 10 reps  - Forward Monster Walk with Resistance (BKA)  - 1 x daily - 7 x weekly - 3 sets - 10 reps  - Backward Monster Walks  - 1 x daily - 7 x weekly - 3 sets - 10 reps                         "

## 2025-02-21 ENCOUNTER — OFFICE VISIT (OUTPATIENT)
Dept: PHYSICAL THERAPY | Facility: CLINIC | Age: 25
End: 2025-02-21
Payer: COMMERCIAL

## 2025-02-21 DIAGNOSIS — M25.552 LEFT HIP PAIN: Primary | ICD-10-CM

## 2025-02-21 PROCEDURE — 97140 MANUAL THERAPY 1/> REGIONS: CPT

## 2025-02-21 NOTE — PROGRESS NOTES
"Daily Note     Today's date: 2025  Patient name: Nupur Coles  : 2000  MRN: [id number]  Referring provider: Lombardi, Frank, DO  Dx:   Encounter Diagnosis     ICD-10-CM    1. Left hip pain  M25.552           Start Time: 930  Stop Time: 1020  Total time in clinic (min): 50 minutes    Subjective: Reports that she has not been eating much as she has been sad.      Objective: See treatment diary below      Assessment: Tolerated treatment well. Patient would benefit from continued PT in order to increase quad, hip, and glute strength to support left hip. Some left knee discomfort during session. Challenged by BOSU squats. Discussed importance of properly fueling body to maintain muscular function and heart health. Some effusion lateral and medial left knee.       Plan: Continue per plan of care.        Insurance Eval/ Re-eval POC expires Auth Status Total visits  Start date  Expiration date Misc    24  60       2/7/25 3/30/25            Precautions: anxiety  Past Medical History:   Diagnosis Date    Complication of left ear piercing     last assessed: 17    Exposure to blood or body fluid     last assessed: 17    Moderate major depression (HCC)     last assessed: 17         Date 25   Visit Number 1 2 3 4 5 6   Auth                  Manual         Posterior thrust of L hip      DN- see above    Grade V Long axis manipulation of L hip  Hip mobs with belt                 Neuro Re-ed         Hip burners          Clamshells         Bridge   Bridge hip drop 25, 15 red loop    KB squats 3 rounds 20 sec 35# Med ball slams 15# 2*15    Quad set  Resisted hip opener 25 l, 15 R gtb Clam shell red loop 25, 15 R Clam shell red loop 25, 15 R raised onto forearm Clam shell red loop 25, 15 R raised onto forearm KB squats 3 rounds 20 sec 35#      Staggered sit to stand with 20# 3*10 Staggered sit to stand with 20# 3*10 12\" box jumps 15 x 3+ 5 on " "18\" box     TherEx         NuStep warmup Sliders abd, 45 deg, ext 2*10 b/l +9# Sliders abd, 45 deg, ext 2*10 b/l +9#  20x bridges with 35# Sliders abd, 45 deg, ext 2*10 b/l +9#    Squats         Side stepping with loop lateral walks 15' x2,  monster walks, reverse skater BTB Alt walking lunge with twist 02' x 4 10# + repeat reverse lunge Resisted walks 17.5 # 10 forward, backward, sideways  Resisted walks 17.5 # 10 forward, backward, sideways Resisted walks 17.5 # 10 forward, backward, sideways   HR Step up with 10# 15x Step up with 10# 15x Walking lunges with 9# KB  [place] carry 25# 50' x 4    SLR          LAQ Hip burners 2*10 5 sec hold b/l Hip burners 2*10 5 sec hold b/l    BOSU ball squats 15x   SAQ Bridge with single leg drip out blue loop 15x Dead lifts 20#   Dead lifts 20# 2*10 , 115, 125, 135 10x ea     Alt knee drive off BOSU 20x ea    ateral walks 15' x2,  monster walks, reverse skater BTB     Hops backward, sprint forward 4x         Ankle mobility on step                                           TherAct         Patient education    Squat mechanics                                         Gait Training                                    Modalities         CP         Access Code: [email or web address]  URL: [email or web address]  Date: 12/03/2024  Prepared by: Gladis Nobles-Esteban    Exercises  - Side Stepping with Resistance at Thighs  - 1 x daily - 7 x weekly - 3 sets - 10 reps  - Forward Monster Walk with Resistance (BKA)  - 1 x daily - 7 x weekly - 3 sets - 10 reps  - Backward Monster Walks  - 1 x daily - 7 x weekly - 3 sets - 10 reps                           "

## 2025-02-28 ENCOUNTER — APPOINTMENT (OUTPATIENT)
Dept: PHYSICAL THERAPY | Facility: CLINIC | Age: 25
End: 2025-02-28
Payer: COMMERCIAL

## 2025-02-28 NOTE — PROGRESS NOTES
"Daily Note     Today's date: 2025  Patient name: Nupur Coles  : 2000  MRN: [id number]  Referring provider: Lombardi, Frank, DO  Dx: No diagnosis found.               Subjective: ***      Objective: See treatment diary below      Assessment: Tolerated treatment {Tolerated treatment :6942709971}. Patient {assessment:0672187153}      Plan: {PLAN:5886951388}       Insurance Eval/ Re-eval POC expires Auth Status Total visits  Start date  Expiration date Misc    24  60       2/7/25 3/30/25            Precautions: anxiety  Past Medical History:   Diagnosis Date    Complication of left ear piercing     last assessed: 17    Exposure to blood or body fluid     last assessed: 17    Moderate major depression (HCC)     last assessed: 17         Date 25   Visit Number 1 2 3 4 5 6 7   Auth                    Manual          Posterior thrust of L hip      DN- see above     Grade V Long axis manipulation of L hip  Hip mobs with belt                   Neuro Re-ed          Hip burners           Clamshells          Bridge   Bridge hip drop 25, 15 red loop    KB squats 3 rounds 20 sec 35# Med ball slams 15# 2*15  Med ball slams 15# 2*15   Quad set  Resisted hip opener 25 l, 15 R gtb Clam shell red loop 25, 15 R Clam shell red loop 25, 15 R raised onto forearm Clam shell red loop 25, 15 R raised onto forearm KB squats 3 rounds 20 sec 35#  KB squats 3 rounds 20 sec 35#     Staggered sit to stand with 20# 3*10 Staggered sit to stand with 20# 3*10 12\" box jumps 15 x 3+ 5 on 18\" box   RDL 9# 20x b/l   TherEx          NuStep warmup Sliders abd, 45 deg, ext 2*10 b/l +9# Sliders abd, 45 deg, ext 2*10 b/l +9#  20x bridges with 35# Sliders abd, 45 deg, ext 2*10 b/l +9#  Sliders abd, 45 deg, ext 2*10 b/l +9#   Squats          Side stepping with loop lateral walks 15' x2,  monster walks, reverse skater BTB Alt walking lunge with twist 02' x 4 10# + " repeat reverse lunge Resisted walks 17.5 # 10 forward, backward, sideways  Resisted walks 17.5 # 10 forward, backward, sideways Resisted walks 17.5 # 10 forward, backward, sideways    HR Step up with 10# 15x Step up with 10# 15x Walking lunges with 9# KB  [place] carry 25# 50' x 4     SLR           LAQ Hip burners 2*10 5 sec hold b/l Hip burners 2*10 5 sec hold b/l    BOSU ball squats 15x BOSU ball squats 15x   SAQ Bridge with single leg drip out blue loop 15x Dead lifts 20#   Dead lifts 20# 2*10 , 115, 125, 135 10x ea , 115, 125, 135 10x ea     Alt knee drive off BOSU 20x ea    ateral walks 15' x2,  monster walks, reverse skater BTB      Hops backward, sprint forward 4x          Ankle mobility on step                                                TherAct          Patient education    Squat mechanics                                              Gait Training                                        Modalities          CP          Access Code: [email or web address]  URL: [email or web address]  Date: 12/03/2024  Prepared by: Gladis Nobles-Esteban    Exercises  - Side Stepping with Resistance at Thighs  - 1 x daily - 7 x weekly - 3 sets - 10 reps  - Forward Monster Walk with Resistance (BKA)  - 1 x daily - 7 x weekly - 3 sets - 10 reps  - Backward Monster Walks  - 1 x daily - 7 x weekly - 3 sets - 10 reps

## 2025-03-07 ENCOUNTER — OFFICE VISIT (OUTPATIENT)
Dept: PHYSICAL THERAPY | Facility: CLINIC | Age: 25
End: 2025-03-07
Payer: COMMERCIAL

## 2025-03-07 DIAGNOSIS — M25.552 LEFT HIP PAIN: Primary | ICD-10-CM

## 2025-03-07 PROCEDURE — 97110 THERAPEUTIC EXERCISES: CPT

## 2025-03-07 NOTE — PROGRESS NOTES
"Daily Note     Today's date: 3/7/2025  Patient name: Nupur Coles  : 2000  MRN: [id number]  Referring provider: Lombardi, Frank, DO  Dx:   Encounter Diagnosis     ICD-10-CM    1. Left hip pain  M25.552           Start Time: 930  Stop Time: 1020  Total time in clinic (min): 50 minutes    Subjective: Was really sick for a week      Objective: See treatment diary below      Assessment: Tolerated treatment well. Patient would benefit from continued PT in order to strengthen quads, hips and glutes. Nupur has made slow but steady progress toward goals and reported no hip pain today. Left knee was bothering her yesterday. Added in HS work today. Continue to progress strengthening.       Plan: Continue per plan of care.        Insurance Eval/ Re-eval POC expires Auth Status Total visits  Start date  Expiration date Misc    24  60       2/7/25 3/30/25            Precautions: anxiety  Past Medical History:   Diagnosis Date    Complication of left ear piercing     last assessed: 17    Exposure to blood or body fluid     last assessed: 17    Moderate major depression (HCC)     last assessed: 17         Date 1/31/25 2/7/25 2/14/25 2/21/25 2/28/25 3/7/25   Visit Number 3 4 5 6 7 8   Auth                  Manual         Posterior thrust of L hip    DN- see above      Grade V Long axis manipulation of L hip                   Neuro Re-ed         Hip burners          Clamshells         Bridge   KB squats 3 rounds 20 sec 35# Med ball slams 15# 2*15  Med ball slams 15# 2*15    Quad set  Clam shell red loop 25, 15 R raised onto forearm Clam shell red loop 25, 15 R raised onto forearm KB squats 3 rounds 20 sec 35#  KB squats 3 rounds 20 sec 35# Nordic tension curl with UE assistance 10x    Staggered sit to stand with 20# 3*10 12\" box jumps 15 x 3+ 5 on 18\" box   RDL 9# 20x b/l    TherEx         NuStep warmup  20x bridges with 35# Sliders abd, 45 deg, ext 2*10 b/l +9#  Sliders abd, 45 deg, ext " 2*10 b/l +9# Sliders abd, 45 deg, ext 2*10 b/l + large TT   Squats         Side stepping with loop Resisted walks 17.5 # 10 forward, backward, sideways  Resisted walks 17.5 # 10 forward, backward, sideways Resisted walks 17.5 # 10 forward, backward, sideways  Bridges with add squeeze + 10# 25x   HR Walking lunges with 9# KB  [place] carry 25# 50' x 4      SLR          LAQ    BOSU ball squats 15x BOSU ball squats 15x BOSU ball squats 15x   SAQ   Dead lifts 20# 2*10 , 115, 125, 135 10x ea , 115, 125, 135 10x ea , 115, 125, 135 10x ea       ateral walks 15' x2,  monster walks, reverse skater BTB  lateral walks 15' x2,  monster walks, reverse skater BTB                                                         TherAct         Patient education  Squat mechanics                                           Gait Training                                    Modalities         CP         Access Code: [email or web address]  URL: [email or web address]  Date: 12/03/2024  Prepared by: Gladis Nobles-Esteban    Exercises  - Side Stepping with Resistance at Thighs  - 1 x daily - 7 x weekly - 3 sets - 10 reps  - Forward Monster Walk with Resistance (BKA)  - 1 x daily - 7 x weekly - 3 sets - 10 reps  - Backward Monster Walks  - 1 x daily - 7 x weekly - 3 sets - 10 reps

## 2025-03-12 ENCOUNTER — OFFICE VISIT (OUTPATIENT)
Dept: PHYSICAL THERAPY | Facility: CLINIC | Age: 25
End: 2025-03-12
Payer: COMMERCIAL

## 2025-03-12 DIAGNOSIS — M25.552 LEFT HIP PAIN: Primary | ICD-10-CM

## 2025-03-12 PROCEDURE — 97110 THERAPEUTIC EXERCISES: CPT

## 2025-03-12 PROCEDURE — 97112 NEUROMUSCULAR REEDUCATION: CPT

## 2025-03-12 NOTE — PROGRESS NOTES
"Daily Note     Today's date: 3/12/2025  Patient name: Nupur Coles  : 2000  MRN: [id number]  Referring provider: Lombardi, Frank, DO  Dx:   Encounter Diagnosis     ICD-10-CM    1. Left hip pain  M25.552           Start Time: 1100  Stop Time: 1145  Total time in clinic (min): 45 minutes    Subjective: Reports no hip and decreased knee pain. Only pain with heavy knee extension which disappeared with decreased weight and increased reps      Objective: See treatment diary below      Assessment: Tolerated treatment well. Patient would benefit from continued PT in order to build quad, hip and glute strength. Introduced banded penguin, lateral, and hip circumduction, bridges on peanut. Did well with additional strength exercises.       Plan: Continue per plan of care.        Insurance Eval/ Re-eval POC expires Auth Status Total visits  Start date  Expiration date Misc    24  60       2/7/25 3/30/25            Precautions: anxiety  Past Medical History:   Diagnosis Date    Complication of left ear piercing     last assessed: 17    Exposure to blood or body fluid     last assessed: 17    Moderate major depression (HCC)     last assessed: 17         Date 1/31/25 2/7/25 2/14/25 2/21/25 2/28/25 3/7/25 3/12/25   Visit Number [phone]   Auth                    Manual          Posterior thrust of L hip    DN- see above       Grade V Long axis manipulation of L hip                     Neuro Re-ed          Hip burners           Clamshells          Bridge   KB squats 3 rounds 20 sec 35# Med ball slams 15# 2*15  Med ball slams 15# 2*15     Quad set  Clam shell red loop 25, 15 R raised onto forearm Clam shell red loop 25, 15 R raised onto forearm KB squats 3 rounds 20 sec 35#  KB squats 3 rounds 20 sec 35# Nordic tension curl with UE assistance 10x Standing resisted penguin, lateral walks lavendar band 20x    Staggered sit to stand with 20# 3*10 12\" box jumps 15 x 3+ 5 on 18\" box   RDL " 9# 20x b/l  Standing hip cirumduction lavendar band 20x   TherEx          NuStep warmup  20x bridges with 35# Sliders abd, 45 deg, ext 2*10 b/l +9#  Sliders abd, 45 deg, ext 2*10 b/l +9# Sliders abd, 45 deg, ext 2*10 b/l + large TT    Squats          Side stepping with loop Resisted walks 17.5 # 10 forward, backward, sideways  Resisted walks 17.5 # 10 forward, backward, sideways Resisted walks 17.5 # 10 forward, backward, sideways  Bridges with add squeeze + 10# 25x bridges with add squeeze + 10# 25x   HR Walking lunges with 9# KB  [place] carry 25# 50' x 4    HS curls from peanut 20x   SLR           LAQ    BOSU ball squats 15x BOSU ball squats 15x BOSU ball squats 15x BOSU ball squats 15x   SAQ   Dead lifts 20# 2*10 , 115, 125, 135 10x ea , 115, 125, 135 10x ea , 115, 125, 135 10x ea BOSU lunge 15x       ateral walks 15' x2,  monster walks, reverse skater BTB  lateral walks 15' x2,  monster walks, reverse skater BTB Sliders 15x abd, 45, ext 25#          KB swing 25# 2* 10                                                     TherAct          Patient education  Squat mechanics                                                Gait Training                                        Modalities          CP          Access Code: [email or web address]  URL: [email or web address]  Date: 12/03/2024  Prepared by: Gladis Nobles-Esteban    Exercises  - Side Stepping with Resistance at Thighs  - 1 x daily - 7 x weekly - 3 sets - 10 reps  - Forward Monster Walk with Resistance (BKA)  - 1 x daily - 7 x weekly - 3 sets - 10 reps  - Backward Monster Walks  - 1 x daily - 7 x weekly - 3 sets - 10 reps

## 2025-03-17 ENCOUNTER — CONSULT (OUTPATIENT)
Dept: DERMATOLOGY | Facility: CLINIC | Age: 25
End: 2025-03-17
Payer: COMMERCIAL

## 2025-03-17 VITALS — WEIGHT: 138.2 LBS | TEMPERATURE: 98.5 F | BODY MASS INDEX: 21.65 KG/M2

## 2025-03-17 DIAGNOSIS — D22.9 MULTIPLE MELANOCYTIC NEVI: ICD-10-CM

## 2025-03-17 DIAGNOSIS — L98.9 SKIN LESION: ICD-10-CM

## 2025-03-17 DIAGNOSIS — L21.9 SEBORRHEIC DERMATITIS: Primary | ICD-10-CM

## 2025-03-17 PROCEDURE — 99203 OFFICE O/P NEW LOW 30 MIN: CPT | Performed by: REGISTERED NURSE

## 2025-03-17 RX ORDER — KETOCONAZOLE 20 MG/ML
SHAMPOO, SUSPENSION TOPICAL
Qty: 100 ML | Refills: 10 | Status: SHIPPED | OUTPATIENT
Start: 2025-03-17

## 2025-03-17 NOTE — PROGRESS NOTES
"[facility] Dermatology Clinic Note     Patient Name: Nupur Coles  Encounter Date: 3/17/2025     Have you been cared for by a [facility] Dermatologist in the last 3 years and, if so, which description applies to you?    NO.   I am considered a \"new\" patient and must complete all patient intake questions. I am FEMALE/of child-bearing potential.    REVIEW OF SYSTEMS:  Have you recently had or currently have any of the following? Recent fever or chills? No  Any non-healing wound? No  Are you pregnant or planning to become pregnant? No  Are you currently or planning to be nursing or breast feeding? No   PAST MEDICAL HISTORY:  Have you personally ever had or currently have any of the following?  If \"YES,\" then please provide more detail. Skin cancer (such as Melanoma, Basal Cell Carcinoma, Squamous Cell Carcinoma?  No  Tuberculosis, HIV/AIDS, Hepatitis B or C: No  Radiation Treatment No   HISTORY OF IMMUNOSUPPRESSION:   Do you have a history of any of the following:  Systemic Immunosuppression such as Diabetes, Biologic or Immunotherapy, Chemotherapy, Organ Transplantation, Bone Marrow Transplantation or Prednsione?  No    Answering \"YES\" requires the addition of the dotphrase \"IMMUNOSUPPRESSED\" as the first diagnosis of the patient's visit.   FAMILY HISTORY:  Any \"first degree relatives\" (parent, brother, sister, or child) with the following?    Skin Cancer, Pancreatic or Other Cancer? YES, mother pre skin cancer, maternal grandmother skin cancer unknown kind   PATIENT EXPERIENCE:    Do you want the Dermatologist to perform a COMPLETE skin exam today including a clinical examination under the \"bra and underwear\" areas?  Yes  If necessary, do we have your permission to call and leave a detailed message on your Preferred Phone number that includes your specific medical information?  Yes      Allergies   Allergen Reactions    Clarithromycin       Current Outpatient Medications:     amphetamine-dextroamphetamine " (ADDERALL, 20MG,) 20 mg tablet, Take 1 tablet (20 mg total) by mouth 2 (two) times a day Max Daily Amount: 40 mg, Disp: 60 tablet, Rfl: 0    fluticasone (FLONASE) 50 mcg/act nasal spray, 2 sprays into each nostril daily, Disp: 48 g, Rfl: 1    Jencycla 0.35 MG tablet, TAKE 1 TABLET BY MOUTH EVERY DAY, Disp: 84 tablet, Rfl: 1          Whom besides the patient is providing clinical information about today's encounter?   NO ADDITIONAL HISTORIAN (patient alone provided history)    Physical Exam and Assessment/Plan by Diagnosis:        MELANOCYTIC NEVI  -Relevant exam: Scattered over the trunk/extremities are homogenously pigmented brown macules and papules. ELM performed and without concerning findings.  - Exam and clinical history consistent with melanocytic nevi  - Educated on the ABCDE's of melanoma; handout provided  - Counseled to return to clinic prior to scheduled appointment should any of these lesions change or should any new lesions of concern arise  - Counseled on use of sun protection daily. Reviewed latest FDA sunscreen guidelines, including use of broad spectrum (UVA and UVB blocking) sunscreen or sun protective clothing with SPF 30-50 every 2-3 hours and reapplied after exposure to water; use of photoprotective clothing, including a broad brim hat and UPF rated clothing if outdoors for several hours; avoid use of tanning beds as these pose significant risk for melanoma and skin cancer.          SEBORRHEIC DERMATITIS    Physical Exam:  Anatomic Location: scalp  Morphologic Description:  Erythematous plaques with greasy scale  Pertinent Positives:  Pertinent Negatives:    Additional History of Present Condition:  Present on exam     Plan:  Ketoconazole 2% shampoo: Apply to affected area daily for 5 to 10 minutes, then rinse clear.    Medical Complexity:    SELF-LIMITED OR MINOR PROBLEM.  Problem runs a definite and prescribed course, is transient in nature, and is not likely to permanently alter health  status.     Scribe Attestation      I,:  Cherry Kahn MA am acting as a scribe while in the presence of the attending physician.:       I,:  Sean Kumar MD personally performed the services described in this documentation    as scribed in my presence.:

## 2025-03-24 ENCOUNTER — OFFICE VISIT (OUTPATIENT)
Dept: PHYSICAL THERAPY | Facility: CLINIC | Age: 25
End: 2025-03-24
Payer: COMMERCIAL

## 2025-03-24 DIAGNOSIS — M25.552 LEFT HIP PAIN: Primary | ICD-10-CM

## 2025-03-24 PROCEDURE — 97110 THERAPEUTIC EXERCISES: CPT

## 2025-03-24 NOTE — PROGRESS NOTES
Daily Note     Today's date: 3/24/2025  Patient name: Nupur Coles  : 2000  MRN: [id number]  Referring provider: Lombardi, Frank, DO  Dx:   Encounter Diagnosis     ICD-10-CM    1. Left hip pain  M25.552           Start Time: 930          Subjective: Left knee is hurting today      Objective: See treatment diary below      Assessment: Tolerated treatment well. Patient would benefit from continued PT in order to increase glute, hip and core strength. Fatigued by wall squat with medicine ball rotation. Off loads during squat to right LE likely due to prior fracture and healing of left LE. Discussed squat mechanics.       Plan: Continue per plan of care.        Insurance Eval/ Re-eval POC expires Auth Status Total visits  Start date  Expiration date Misc    24  60       2/7/25 3/30/25            Precautions: anxiety  Past Medical History:   Diagnosis Date    Complication of left ear piercing     last assessed: 17    Exposure to blood or body fluid     last assessed: 17    Moderate major depression (HCC)     last assessed: 17         Date 2/14/25 2/21/25 2/28/25 3/7/25 3/12/25 3/24/25    Visit Number [phone] FOTO    Auth                    Manual          Posterior thrust of L hip  DN- see above         Grade V Long axis manipulation of L hip                     Neuro Re-ed          Hip burners           Clamshells          Bridge  Med ball slams 15# 2*15  Med ball slams 15# 2*15       Quad set  KB squats 3 rounds 20 sec 35#  KB squats 3 rounds 20 sec 35# Nordic tension curl with UE assistance 10x Standing resisted penguin, lateral walks lavendar band 20x Nordic tension curl with UE assistance 10x       RDL 9# 20x b/l  Standing hip cirumduction lavendar band 20x Standing hip cirumduction lavendar band 20x    TherEx          NuStep warmup Sliders abd, 45 deg, ext 2*10 b/l +9#  Sliders abd, 45 deg, ext 2*10 b/l +9# Sliders abd, 45 deg, ext 2*10 b/l + large TT      Squats           Side stepping with loop Resisted walks 17.5 # 10 forward, backward, sideways Resisted walks 17.5 # 10 forward, backward, sideways  Bridges with add squeeze + 10# 25x bridges with add squeeze + 10# 25x Leg ext 3*10    HR [place] carry 25# 50' x 4    HS curls from peanut 20x HS curl 3*10    SLR           LAQ  BOSU ball squats 15x BOSU ball squats 15x BOSU ball squats 15x BOSU ball squats 15x BOSU ball squats 15x    SAQ Dead lifts 20# 2*10 , 115, 125, 135 10x ea , 115, 125, 135 10x ea , 115, 125, 135 10x ea BOSU lunge 15x       ateral walks 15' x2,  monster walks, reverse skater BTB  lateral walks 15' x2,  monster walks, reverse skater BTB Sliders 15x abd, 45, ext 25# Sliders 15x abd, 45, ext 25#         KB swing 25# 2* 10 KB swing 25# + KB squat 2* 10                    Med ball wall squat 12# 25x                    Lunge KB unders 9# 10x ea              TherAct          Patient education                                                  Gait Training                                        Modalities          CP          Access Code: [email or web address]  URL: [email or web address]  Date: 12/03/2024  Prepared by: Gladis Nobles-Esteban    Exercises  - Side Stepping with Resistance at Thighs  - 1 x daily - 7 x weekly - 3 sets - 10 reps  - Forward Monster Walk with Resistance (BKA)  - 1 x daily - 7 x weekly - 3 sets - 10 reps  - Backward Monster Walks  - 1 x daily - 7 x weekly - 3 sets - 10 reps

## 2025-03-26 ENCOUNTER — APPOINTMENT (OUTPATIENT)
Dept: PHYSICAL THERAPY | Facility: CLINIC | Age: 25
End: 2025-03-26
Payer: COMMERCIAL

## 2025-03-26 ENCOUNTER — HOSPITAL ENCOUNTER (OUTPATIENT)
Dept: SLEEP CENTER | Facility: CLINIC | Age: 25
Discharge: HOME | End: 2025-03-26

## 2025-03-26 NOTE — PROGRESS NOTES
Patient arrived at sleep center for HST and asked if she could wear unit tomorrow night instead of tonight  Tech explained to patient that the unit needs to go out tomorrow night to another patient so she would have to wear unit tonight.  Patient stated she has too much work to do tonight and she took an Adderall so she would not be able to accurately complete HST.  Patient elected to reschedule Home sleep study.    DOUGLAS Coyle, RPSGT

## 2025-03-31 ENCOUNTER — OFFICE VISIT (OUTPATIENT)
Dept: PHYSICAL THERAPY | Facility: CLINIC | Age: 25
End: 2025-03-31
Payer: COMMERCIAL

## 2025-03-31 DIAGNOSIS — M25.552 LEFT HIP PAIN: Primary | ICD-10-CM

## 2025-03-31 PROCEDURE — 97110 THERAPEUTIC EXERCISES: CPT

## 2025-03-31 PROCEDURE — 97112 NEUROMUSCULAR REEDUCATION: CPT

## 2025-03-31 NOTE — PROGRESS NOTES
PT Re-Evaluation/Daily Note     Today's date: 3/31/2025  Patient name: Nupur Coles  : 2000  MRN: [id number]  Referring provider: Lombardi, Frank, DO  Dx:   Encounter Diagnosis     ICD-10-CM    1. Left hip pain  M25.552           Start Time: 08  Stop Time: 0915  Total time in clinic (min): 45 minutes    Assessment  Assessment details: Patient is a 24 y.o. Female who presents to skilled outpatient PT for referring diagnoses include left hip pain. Primary movement impairment diagnosis of decreased power, muscular imbalance R > L, decreased hip IR ROM bilaterally resulting in pathoanatomical symptoms of nociceptive pain with high intensity recreational activities. Nupur has made slow but steady progress toward goals and increasing lower extremity strength. She rotates left innominate anteriorly with a hip hike  with squats which she corrected with VC. She remains a good candidate for skilled physical therapy.  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      Impairments: Abnormal or restricted ROM, Activity intolerance, Impaired physical strength, Lacks appropriate HEP, and Pain with function  Understanding of Dx/Px/POC: Good  Prognosis: Good     Patient verbalized understanding of POC. Please contact me if you have any questions or recommendations. Thank you for the referral and the opportunity to share in Nupur Coles's care.     Plan  Patient would benefit from: PT Eval and Skilled PT  Planned modality interventions: Dry needling, Biofeedback, Cryotherapy, TENS, Thermotherapy: Hydrocollator Packs, and Traction  Planned therapy interventions: Abdominal trunk stabilization, Dry Needling, Functional ROM exercises, HEP, Joint mobilization, Manual therapy, Baeza taping, Neuromuscular re-education, Patient education, Strengthening, Therapeutic activities, and Therapeutic exercises  Frequency: 2x/wk  Duration in weeks: 8  Plan of Care  beginning date: 11/25/24  Plan of Care expiration date: 8 weeks - 3/30/2025  Treatment plan discussed with: Patient        Goals  Short Term Goals (4 weeks):  all met as of 2/7/25  - Patient will be independent in basic HEP 2-3 weeks  - Patient will demonstrate knee ROM WNL for ease with gait.   - Patient will have 0/10 pain at rest  - Patient will demonstrate >1/3 improvement in MMT grade as applicable     Long Term Goals (8 weeks): progressing as of 3/31/25  - Patient will be independent in a comprehensive home exercise program MET  - Patient FOTO score will improve by 10 points. MET  - Patient will independently ambulate >1000 feet (community ambulation)MET  - Patient will self-report >75% improvement in function  - Patient  will be able resume lifting activities with min to no hip pain  - Patient  will be able to jog with min to no hip pain         Subjective     History of Present Illness  - Mechanism of injury: Pt broke L femur when she was 11 years old. Had a sedentary lifestyle before starting to lift at gym when she was [age] years old. She has noticed that she has a muscle imbalance with R > L. Currently, she is experiencing a dull, constant pain in L lateral hip.      - Functional limitations: walking after sitting for long periods working as a       - Patient goals: hip and knee pain to decrease, be able to jog        - Red flag screening:   Positive for: recent infection  Negative for: age >50 years old, history of cancer, fever, chills, night sweats, weight loss, immunosuppression, rest/night pain, saddle anesthesia, bladder dysfunction, and LE neurological deficits        Update 2/7/25: Reports a 70% improvement but still notes disparity from right to left hip when exercising. Hip feels pretty good, had some shooting pain down front of left leg while wearing platform crocs, but the pain went away.    Update 3/31/25: Reports a 75% improvement but still feels the right hip is weaker than left.  Is consistent with HEP in gym and feels she is making progress but still has more to go to reach goals. Reports no hip pain but there is popping. Still challenged by BOSU squats        Pain  - Current pain ratin/10  - At best pain ratin/10  - At worst pain ratin/10  - Location: lateral side of L hip   - Alleviating factors: take a day or two off from exercising        Objective      LE MMT  L Hip Flexion: 5/5             R Hip Flexion: 5/5   L Hip Extension: 4+/5 R Hip Extension: 5/5   L Hip Abduction: 4+/5 R Hip Abduction: 5/5   L Hip Adduction: NT R Hip Adduction: NT   L Hip IR: 4+/5 with pain  R Hip IR: 5/5   L Hip ER: 5/5 R Hip ER: 5/5   L Knee Extension: 5/5 R Knee Extension: 5/5   L Knee Flexion: 5/5 R Knee Flexion: 5/5   L Ankle DF: 5/5 R Ankle DF: 5/5   L Ankle PF: NT             R Ankle PF: NT         LE ROM     L hip flexion: WNL R hip flexion: WNL   L hip extension: NT R hip extension: NT   L hip IR: limited  R hip IR: limited   L hip ER: WNL  R hip ER: WNL    L hip abduction: NT R hip abduction: NT         Special Testing L R   FABIR negative negative   FADIR positive  negative   Hip Scour  positive  negative   Thigh thrust NT NT   Flick test  NT NT   Standing flexion test  positive  negative   Prone knee flexion test NT NT    Supine to long sit test positive  negative   Neurodynamic assessment  NT NT       L innominate was observed to be anteriorly rotated, posterior thrust was performed.   Grade 5 long axis manipulation was performed due to upslip on L side.      Sensation  - Light touch: NT     Hip Comments  - Static standing foot alignment: NT  - Functional squat: NT                Insurance Eval/ Re-eval POC expires Auth Status Total visits  Start date  Expiration date Misc    24  60       2/7/25 3/30/25         3/31/25 5/31/25            Precautions: anxiety  Past Medical History:   Diagnosis Date    Complication of left ear piercing     last assessed: 17    Exposure  to blood or body fluid     last assessed: 01/31/17    Moderate major depression (HCC)     last assessed: 07/25/17         Date 2/14/25 2/21/25 2/28/25 3/7/25 3/12/25 3/24/25 3/31/25   Visit Number [phone] FOTO 11   Auth                    Manual          Posterior thrust of L hip  DN- see above         Grade V Long axis manipulation of L hip                     Neuro Re-ed          Hip burners           Clamshells          Bridge  Med ball slams 15# 2*15  Med ball slams 15# 2*15       Quad set  KB squats 3 rounds 20 sec 35#  KB squats 3 rounds 20 sec 35# Nordic tension curl with UE assistance 10x Standing resisted penguin, lateral walks lavendar band 20x Nordic tension curl with UE assistance 10x       RDL 9# 20x b/l  Standing hip cirumduction lavendar band 20x Standing hip cirumduction lavendar band 20x    TherEx          NuStep warmup Sliders abd, 45 deg, ext 2*10 b/l +9#  Sliders abd, 45 deg, ext 2*10 b/l +9# Sliders abd, 45 deg, ext 2*10 b/l + large TT      Squats          Side stepping with loop Resisted walks 17.5 # 10 forward, backward, sideways Resisted walks 17.5 # 10 forward, backward, sideways  Bridges with add squeeze + 10# 25x bridges with add squeeze + 10# 25x Leg ext 3*10    HR [place] carry 25# 50' x 4    HS curls from peanut 20x HS curl 3*10    SLR           LAQ  BOSU ball squats 15x BOSU ball squats 15x BOSU ball squats 15x BOSU ball squats 15x BOSU ball squats 15x BOSU ball squats 15x   SAQ Dead lifts 20# 2*10 , 115, 125, 135 10x ea , 115, 125, 135 10x ea , 115, 125, 135 10x ea BOSU lunge 15x       ateral walks 15' x2,  monster walks, reverse skater BTB  lateral walks 15' x2,  monster walks, reverse skater BTB Sliders 15x abd, 45, ext 25# Sliders 15x abd, 45, ext 25# 9# KB RDL 8*3 bl        KB swing 25# 2* 10 KB swing 25# + KB squat 2* 10 ateral walks 15' x2,  monster walks, reverse skater BTB                   Med ball wall squat 12# 25x Single leg bridge + 25# 25x b/l                    Summer KB unders 9# 10x ea Summer KB unders 9# 10x ea             TherAct          Patient education                                                  Gait Training                                        Modalities          CP          Access Code: [email or web address]  URL: [email or web address]  Date: 12/03/2024  Prepared by: Gladis Nobles-Esteban    Exercises  - Side Stepping with Resistance at Thighs  - 1 x daily - 7 x weekly - 3 sets - 10 reps  - Forward Monster Walk with Resistance (BKA)  - 1 x daily - 7 x weekly - 3 sets - 10 reps  - Backward Monster Walks  - 1 x daily - 7 x weekly - 3 sets - 10 reps

## 2025-04-07 ENCOUNTER — OFFICE VISIT (OUTPATIENT)
Dept: PHYSICAL THERAPY | Facility: CLINIC | Age: 25
End: 2025-04-07
Payer: COMMERCIAL

## 2025-04-07 DIAGNOSIS — M25.552 LEFT HIP PAIN: Primary | ICD-10-CM

## 2025-04-07 PROCEDURE — 97112 NEUROMUSCULAR REEDUCATION: CPT

## 2025-04-07 PROCEDURE — 97110 THERAPEUTIC EXERCISES: CPT

## 2025-04-07 NOTE — PROGRESS NOTES
Daily Note     Today's date: 2025  Patient name: Nupur Coles  : 2000  MRN: [id number]  Referring provider: Lombardi, Frank, DO  Dx:   Encounter Diagnosis     ICD-10-CM    1. Left hip pain  M25.552                      Subjective: Patient reports persistent Lt knee pain.       Objective: See treatment diary below      Assessment: Tolerated treatment well. Focused today's session on progressive loading of the hip and knee in circuit training format. Patient tolerated activities well with modifications to perform SL activities to accommodate mobility differences noted bilaterally. Patient demonstrated fatigue post treatment and would benefit from continued PT to address functional mobility deficits and return to PLOF.       Plan: Continue per plan of care.          Insurance Eval/ Re-eval POC expires Auth Status Total visits  Start date  Expiration date Misc    24  60       2/7/25 3/30/25         3/31/25 5/31/25            Precautions: anxiety  Past Medical History:   Diagnosis Date    Complication of left ear piercing     last assessed: 17    Exposure to blood or body fluid     last assessed: 17    Moderate major depression (HCC)     last assessed: 17         Date 3/7/25 3/12/25 3/24/25 3/31/25 4/7/25   Visit Number [phone] FOTO 11 12   Auth                Manual        Posterior thrust of L hip         Grade V Long axis manipulation of L hip                 Neuro Re-ed        Hip burners         Clamshells        Bridge         Quad set  Nordic tension curl with UE assistance 10x Standing resisted penguin, lateral walks lavendar band 20x Nordic tension curl with UE assistance 10x       Standing hip cirumduction lavendar band 20x Standing hip cirumduction lavendar band 20x     TherEx        NuStep warmup Sliders abd, 45 deg, ext 2*10 b/l + large TT       Squats        Side stepping with loop Bridges with add squeeze + 10# 25x bridges with add squeeze + 10# 25x Leg ext  3*10     HR  HS curls from peanut 20x HS curl 3*10     SLR         LAQ BOSU ball squats 15x BOSU ball squats 15x BOSU ball squats 15x BOSU ball squats 15x BOSU ball squats 2x15   SAQ , 115, 125, 135 10x ea BOSU lunge 15x       lateral walks 15' x2,  monster walks, reverse skater BTB Sliders 15x abd, 45, ext 25# Sliders 15x abd, 45, ext 25# 9# KB RDL 8*3 bl Circuit: 3 rounds x 10 each  - SL [place] MB toss w/ kickstand RDL 10#  - bridge w/ SL HS curl      KB swing 25# 2* 10 KB swing 25# + KB squat 2* 10 ateral walks 15' x2,  monster walks, reverse skater BTB               Med ball wall squat 12# 25x Single leg bridge + 25# 25x b/l               Lunge KB unders 9# 10x ea Lunge KB unders 9# 10x ea TRX lunges into lateral and reverse 2x10 each           TherAct        Patient education                                        Gait Training                                Modalities        CP        Access Code: [email or web address]  URL: [email or web address]  Date: 12/03/2024  Prepared by: Gladis Nobles-Esteban    Exercises  - Side Stepping with Resistance at Thighs  - 1 x daily - 7 x weekly - 3 sets - 10 reps  - Forward Monster Walk with Resistance (BKA)  - 1 x daily - 7 x weekly - 3 sets - 10 reps  - Backward Monster Walks  - 1 x daily - 7 x weekly - 3 sets - 10 reps

## 2025-06-27 NOTE — PROGRESS NOTES
PT Re-Evaluation/Daily Note     Today's date: 2025  Patient name: Nupur Coles  : 2000  MRN: [id number]  Referring provider: Lombardi, Frank, DO  Dx: No diagnosis found.                Assessment  Assessment details: Patient is a 24 y.o. Female who presents to skilled outpatient PT for referring diagnoses include left hip pain. Primary movement impairment diagnosis of decreased power, muscular imbalance R > L, decreased hip IR ROM bilaterally resulting in pathoanatomical symptoms of nociceptive pain with high intensity recreational activities. Nupur has regressed due to non attendance in physical therapy and decreased gym attendance due to personal issues. Nupur has committed the time to do her HEP and updated her goals (below).       Impairments: Abnormal or restricted ROM, Activity intolerance, Impaired physical strength, Lacks appropriate HEP, and Pain with function  Understanding of Dx/Px/POC: Good  Prognosis: Good     Patient verbalized understanding of POC. Please contact me if you have any questions or recommendations. Thank you for the referral and the opportunity to share in Nupur Coles's care.     Plan  Patient would benefit from: PT Eval and Skilled PT  Planned modality interventions: Dry needling, Biofeedback, Cryotherapy, TENS, Thermotherapy: Hydrocollator Packs, and Traction  Planned therapy interventions: Abdominal trunk stabilization, Dry Needling, Functional ROM exercises, HEP, Joint mobilization, Manual therapy, Baeza taping, Neuromuscular re-education, Patient education, Strengthening, Therapeutic activities, and Therapeutic exercises  Frequency: 2x/wk  Duration in weeks: 8  Plan of Care beginning date: 24  Plan of Care expiration date: 8 weeks - 2025  Treatment plan discussed with: Patient        Goals  Short Term Goals (4 weeks):  all met as of 25  - Patient will be independent in basic HEP 2-3 weeks  - Patient will demonstrate knee ROM WNL for ease with  gait.   - Patient will have 0/10 pain at rest  - Patient will demonstrate >1/3 improvement in MMT grade as applicable     Long Term Goals (8 weeks): progressing as of 3/31/25  - Patient will be independent in a comprehensive home exercise program MET  - Patient FOTO score will improve by 10 points. MET  - Patient will independently ambulate >1000 feet (community ambulation)MET  - Patient will self-report >75% improvement in function  - Patient  will be able resume lifting activities with min to no hip pain  - Patient  will be able to jog with min to no hip pain     Updated goals as of 6/30/25  - Decrease hip hike with squat  -Reduce pain while exercising LE  - More even stance in standing rather than left hip forward  - Increase strength in bl LE        Subjective     History of Present Illness  - Mechanism of injury: Pt broke L femur when she was 11 years old. Had a sedentary lifestyle before starting to lift at gym when she was [age] years old. She has noticed that she has a muscle imbalance with R > L. Currently, she is experiencing a dull, constant pain in L lateral hip.          - Functional limitations: walking after sitting for long periods working as a       - Patient goals: hip and knee pain to decrease, be able to jog        - Red flag screening:   Positive for: recent infection  Negative for: age >50 years old, history of cancer, fever, chills, night sweats, weight loss, immunosuppression, rest/night pain, saddle anesthesia, bladder dysfunction, and LE neurological deficits        Update 2/7/25: Reports a 70% improvement but still notes disparity from right to left hip when exercising. Hip feels pretty good, had some shooting pain down front of left leg while wearing platform crocs, but the pain went away.     Update 3/31/25: Reports a 75% improvement but still feels the right hip is weaker than left. Is consistent with HEP in gym and feels she is making progress but still has more to go to reach  goals. Reports no hip pain but there is popping. Still challenged by BOSU squats    Update 25: She has noticed that hip has reverted to tilt since she has stopped going to PT. Some pain when she is working out caused by squatting and hip thrusts (more pain under knee). When doing last leg work out got frustrated because she could not do compound movements without pain. Has been going through a lot of personal stuff so she has not been as consistent in the gym. After sitting for 2-3 hours at work there is pain.         Pain  - Current pain ratin/10  - At best pain ratin/10  - At worst pain ratin/10  - Location: lateral side of L hip   - Alleviating factors: take a day or two off from exercising        Objective      LE MMT  L Hip Flexion: 5/5             R Hip Flexion: 5/5   L Hip Extension: 4+/5 R Hip Extension: 5/5   L Hip Abduction: 4+/5 R Hip Abduction: 5/5   L Hip Adduction: 5/5 R Hip Adduction: 5/5   L Hip IR: 4+/5 with pain  R Hip IR: 5/5   L Hip ER: 5/5 R Hip ER: 5/5   L Knee Extension: 5/5 R Knee Extension: 5/5   L Knee Flexion: 5/5 R Knee Flexion: 5/5   L Ankle DF: 5/5 R Ankle DF: 5/5   L Ankle PF: NT             R Ankle PF: NT         LE ROM     L hip flexion: WNL R hip flexion: WNL   L hip extension: NT R hip extension: NT   L hip IR: limited  R hip IR: limited   L hip ER: WNL  R hip ER: WNL    L hip abduction: NT R hip abduction: NT         Special Testing L R   FABIR negative negative   FADIR positive  negative   Hip Scour  positive  negative   Thigh thrust NT NT   Flick test  NT NT   Standing flexion test  positive  negative   Prone knee flexion test NT NT    Supine to long sit test positive  negative   Neurodynamic assessment  NT NT       L innominate was observed to be anteriorly rotated, posterior thrust was performed.   Grade 5 long axis manipulation was performed due to upslip on L side.      Sensation  - Light touch: intact     Hip Comments  - Static standing foot alignment:  "NT  - Functional squat: hip hike left             Insurance Eval/ Re-eval POC expires Auth Status Total visits  Start date  Expiration date Misc    11/25/24 1/20/25  60       2/7/25 3/30/25         3/31/25 5/31/25         6/30/25 9/15/24            Precautions: anxiety  Past Medical History:   Diagnosis Date    Complication of left ear piercing     last assessed: 09/11/17    Exposure to blood or body fluid     last assessed: 01/31/17    Moderate major depression (HCC)     last assessed: 07/25/17         Date 3/24/25 3/31/25 4/7/25 6/30/25 6/30/2514   Visit Number 10 FOTO 11 12 13 14   Auth                Manual        Posterior thrust of L hip         Grade V Long axis manipulation of L hip                 Neuro Re-ed        Hip burners         Clamshells        Bridge         Quad set  Nordic tension curl with UE assistance 10x    Nordic tension curl with UE assistance 10x    Standing hip cirumduction lavendar band 20x    18\" step up R 20x L 30x 18#   TherEx     Sliders 3 way 18# 20 R, 30 L   NuStep warmup        Squats        Side stepping with loop Leg ext 3*10    Bridge 25# 10 bl, 20 L, 10 R   HR HS curl 3*10       SLR         LAQ BOSU ball squats 15x BOSU ball squats 15x BOSU ball squats 2x15  Clamshells BTB 30x bl   SAQ         Sliders 15x abd, 45, ext 25# 9# KB RDL 8*3 bl Circuit: 3 rounds x 10 each  - SL OH MB toss w/ kickstand RDL 10#  - bridge w/ SL HS curl       KB swing 25# + KB squat 2* 10 ateral walks 15' x2,  monster walks, reverse skater BTB               Med ball wall squat 12# 25x Single leg bridge + 25# 25x b/l               Lunge KB unders 9# 10x ea Lunge KB unders 9# 10x ea TRX lunges into lateral and reverse 2x10 each             TherAct        Patient education                                        Gait Training                                Modalities        CP        Access Code: [email or web address]  URL: [email or web address]  Date: 12/03/2024  Prepared by: Gladis" Noel    Exercises  - Side Stepping with Resistance at Thighs  - 1 x daily - 7 x weekly - 3 sets - 10 reps  - Forward Monster Walk with Resistance (BKA)  - 1 x daily - 7 x weekly - 3 sets - 10 reps  - Backward Monster Walks  - 1 x daily - 7 x weekly - 3 sets - 10 reps

## 2025-06-30 ENCOUNTER — EVALUATION (OUTPATIENT)
Dept: PHYSICAL THERAPY | Facility: CLINIC | Age: 25
End: 2025-06-30
Attending: FAMILY MEDICINE
Payer: COMMERCIAL

## 2025-06-30 DIAGNOSIS — M25.552 LEFT HIP PAIN: Primary | ICD-10-CM

## 2025-06-30 PROCEDURE — 97110 THERAPEUTIC EXERCISES: CPT

## 2025-06-30 PROCEDURE — 97112 NEUROMUSCULAR REEDUCATION: CPT

## 2025-07-07 ENCOUNTER — OFFICE VISIT (OUTPATIENT)
Dept: PHYSICAL THERAPY | Facility: CLINIC | Age: 25
End: 2025-07-07
Attending: FAMILY MEDICINE
Payer: COMMERCIAL

## 2025-07-07 DIAGNOSIS — M25.552 LEFT HIP PAIN: Primary | ICD-10-CM

## 2025-07-07 PROCEDURE — 97112 NEUROMUSCULAR REEDUCATION: CPT

## 2025-07-07 NOTE — PROGRESS NOTES
"Daily Note     Today's date: 2025  Patient name: Nupur Coles  : 2000  MRN: [id number]  Referring provider: Lombardi, Frank, DO  Dx:   Encounter Diagnosis     ICD-10-CM    1. Left hip pain  M25.552                      Subjective: Reports pain with deep squat at gym      Objective: See treatment diary below      Assessment: Tolerated treatment well. Patient would benefit from continued PT in order to strengthen quads, hips and glutes to support left hip. Positive in forward flexion and supine to sit for anteriorly rotated left innominate which responded well to anterior thrust. Did well with hamstring curl on peanut with 10# weight held at 45 deg angle.       Plan: Continue per plan of care.        Insurance Eval/ Re-eval POC expires Auth Status Total visits  Start date  Expiration date Misc    24  60       2/7/25 3/30/25         3/31/25 5/31/25         6/30/25 9/15/24            Precautions: anxiety  Past Medical History:   Diagnosis Date    Complication of left ear piercing     last assessed: 17    Exposure to blood or body fluid     last assessed: 17    Moderate major depression (HCC)     last assessed: 17         Date 3/24/25 3/31/25 4/7/25 6/30/25 6/30/25 7/7/25   Visit Number 10 FOTO 11 12 13 14 15   Auth                  Manual         Posterior thrust of L hip          Grade V Long axis manipulation of L hip                   Neuro Re-ed         Hip burners          Clamshells         Bridge          Quad set  Nordic tension curl with UE assistance 10x    Nordic tension curl with UE assistance 10x Hs curl with peanut and arms at 45 deg angle 10# 2 x 10    Standing hip cirumduction lavendar band 20x    18\" step up R 20x L 30x 18#    TherEx     Sliders 3 way 18# 20 R, 30 L Sliders 3 way 18# 20 R, 30 L   NuStep warmup      Dead lift bar 45# 2x 10   Squats         Side stepping with loop Leg ext 3*10    Bridge 25# 10 bl, 20 L, 10 R Hip burners with squat 2x 10   HR " HS curl 3*10        SLR          LAQ BOSU ball squats 15x BOSU ball squats 15x BOSU ball squats 2x15  Clamshells BTB 30x bl Standing pigeon pose 2 x 30 sec   SAQ          Sliders 15x abd, 45, ext 25# 9# KB RDL 8*3 bl Circuit: 3 rounds x 10 each  - SL OH MB toss w/ kickstand RDL 10#  - bridge w/ SL HS curl        KB swing 25# + KB squat 2* 10 ateral walks 15' x2,  monster walks, reverse skater BTB    lateral walks 15' x2,  monster walks, reverse skater BTB             Med ball wall squat 12# 25x Single leg bridge + 25# 25x b/l    RDL 9# 20x             Lunge KB unders 9# 10x ea Lunge KB unders 9# 10x ea TRX lunges into lateral and reverse 2x10 each   Squat 18# 20x            TherAct         Patient education                                             Gait Training                                    Modalities         CP         Access Code: [email or web address]  URL: [email or web address]  Date: 12/03/2024  Prepared by: Gladis Cohen    Exercises  - Side Stepping with Resistance at Thighs  - 1 x daily - 7 x weekly - 3 sets - 10 reps  - Forward Monster Walk with Resistance (BKA)  - 1 x daily - 7 x weekly - 3 sets - 10 reps  - Backward Monster Walks  - 1 x daily - 7 x weekly - 3 sets - 10 reps

## 2025-07-14 ENCOUNTER — OFFICE VISIT (OUTPATIENT)
Dept: PHYSICAL THERAPY | Facility: CLINIC | Age: 25
End: 2025-07-14
Attending: FAMILY MEDICINE
Payer: COMMERCIAL

## 2025-07-14 DIAGNOSIS — M25.552 LEFT HIP PAIN: Primary | ICD-10-CM

## 2025-07-14 PROCEDURE — 97110 THERAPEUTIC EXERCISES: CPT

## 2025-07-14 PROCEDURE — 97112 NEUROMUSCULAR REEDUCATION: CPT

## 2025-07-14 NOTE — PROGRESS NOTES
"Daily Note     Today's date: 2025  Patient name: Nupur Coles  : 2000  MRN: [id number]  Referring provider: Lombardi, Frank, DO  Dx:   Encounter Diagnosis     ICD-10-CM    1. Left hip pain  M25.552                      Subjective: Patient reported pain in the hip towards the end of 4 mile walk over the weekend.       Objective: See treatment diary below      Assessment: Tolerated treatment well.  Patient required to breath during step ups with kb. Patient would benefit from continued PT in order to strengthen quads, hips and glutes to support left hip. Patient concerned with hip assymetry but had her view reflection in mirror and discussed how we need to retrain our brains to properly perceive proprioception.       Plan: Continue per plan of care.        Insurance Eval/ Re-eval POC expires Auth Status Total visits  Start date  Expiration date Misc    24  60       2/7/25 3/30/25         3/31/25 5/31/25         6/30/25 9/15/24            Precautions: anxiety  Past Medical History:   Diagnosis Date    Complication of left ear piercing     last assessed: 17    Exposure to blood or body fluid     last assessed: 17    Moderate major depression (HCC)     last assessed: 17         Date 3/31/25 4/7/25 6/30/25 6/30/25 7/7/25 7/14   Visit Number 11 12 13 14 15    Auth                  Manual         Posterior thrust of L hip          Grade V Long axis manipulation of L hip                   Neuro Re-ed         Hip burners          Clamshells      SL clam shells with Blue [id number]     Reverse clamshell with adductor squeeze x20   Bridge       Single leg bridges with 25# x 20 R 30L    Quad set     Nordic tension curl with UE assistance 10x Hs curl with peanut and arms at 45 deg angle 10# 2 x 10 Dumbell push on sliders x 20#   4x   Hip drop R  20x w/ 4#   L 15x w/ 3#       18\" step up R 20x L 30x 18#  Standing clamshells x 20R 30 L w/ green TB   TherEx    Sliders 3 way 18# 20 R, 30 L " Sliders 3 way 18# 20 R, 30 L    NuStep warmup     Dead lift bar 45# 2x 10    Squats         Side stepping with loop    Bridge 25# 10 bl, 20 L, 10 R Hip burners with squat 2x 10    HR         SLR          LAQ BOSU ball squats 15x BOSU ball squats 2x15  Clamshells BTB 30x bl Standing pigeon pose 2 x 30 sec    SAQ          9# KB RDL 8*3 bl Circuit: 3 rounds x 10 each  - SL OH MB toss w/ kickstand RDL 10#  - bridge w/ SL HS curl     Pt ed on proprioception    ateral walks 15' x2,  monster walks, reverse skater BTB    lateral walks 15' x2,  monster walks, reverse skater BTB              Single leg bridge + 25# 25x b/l    RDL 9# 20x              Lunge KB unders 9# 10x ea TRX lunges into lateral and reverse 2x10 each   Squat 18# 20x             TherAct         Patient education                                             Gait Training                                    Modalities         CP         Access Code: [email or web address]  URL: [email or web address]  Date: 12/03/2024  Prepared by: Gladis Nobles-Esteban    Exercises  - Side Stepping with Resistance at Thighs  - 1 x daily - 7 x weekly - 3 sets - 10 reps  - Forward Monster Walk with Resistance (BKA)  - 1 x daily - 7 x weekly - 3 sets - 10 reps  - Backward Monster Walks  - 1 x daily - 7 x weekly - 3 sets - 10 reps

## 2025-07-16 ENCOUNTER — OFFICE VISIT (OUTPATIENT)
Dept: URGENT CARE | Facility: CLINIC | Age: 25
End: 2025-07-16
Payer: COMMERCIAL

## 2025-07-16 VITALS
BODY MASS INDEX: 22.37 KG/M2 | OXYGEN SATURATION: 99 % | WEIGHT: 142.8 LBS | TEMPERATURE: 97.9 F | HEART RATE: 86 BPM | RESPIRATION RATE: 12 BRPM | DIASTOLIC BLOOD PRESSURE: 59 MMHG | SYSTOLIC BLOOD PRESSURE: 99 MMHG

## 2025-07-16 DIAGNOSIS — J01.90 ACUTE NON-RECURRENT SINUSITIS, UNSPECIFIED LOCATION: Primary | ICD-10-CM

## 2025-07-16 PROCEDURE — 99213 OFFICE O/P EST LOW 20 MIN: CPT | Performed by: PHYSICIAN ASSISTANT

## 2025-07-16 RX ORDER — BENZONATATE 200 MG/1
200 CAPSULE ORAL 3 TIMES DAILY PRN
Qty: 20 CAPSULE | Refills: 0 | Status: SHIPPED | OUTPATIENT
Start: 2025-07-16

## 2025-07-16 NOTE — PROGRESS NOTES
"[facility]  Name: Nupur Coles      : 2000      MRN: [id number]  Encounter Provider: Randi Long PA-C  Encounter Date: 2025   Encounter department: [facility] [place]  :  Assessment & Plan  Acute non-recurrent sinusitis, unspecified location    Orders:    amoxicillin-clavulanate (AUGMENTIN) 875-125 mg per tablet; Take 1 tablet by mouth 2 (two) times a day with meals for 7 days    benzonatate (TESSALON) 200 MG capsule; Take 1 capsule (200 mg total) by mouth 3 (three) times a day as needed for cough  Start Augmentin given clinical picture. Pt declined prednisone and refused CXR. Advised to take tessalon PRN for cough. Educated on ER precautions and to continue supportive methods for symptoms.       Patient Instructions  Follow up with PCP in 3-5 days.  Proceed to  ER if symptoms worsen.    If tests are performed, our office will contact you with results only if changes need to made to the care plan discussed with you at the visit. [facility]    Chief Complaint:   Chief Complaint   Patient presents with    Cough     Reports \"being sick\" intermittently x 1 month and \"barking cough\" with green phlegm and congestion x 1 week. Also reports \"sharp\" chest pain yesterday and today that resolved. Using NyQuil and DayQuil.      History of Present Illness   Nupur is a 26 y/o female with a PMH of LASHAY, ADHD, and chronic low back pain who presents today for intermittent malaise x 1 month and cough x 2 weeks. Has one known sick contact with similar symptoms. States cough has worsened and is productive in nature. Phlegm is green per patient and also endorses green nasal drainage. Associated chest tenderness from cough and throat discomfort, denies SOB, headache, nausea, vomiting, fever, ear pain, eye pain/discharge, and body aches. Has been taking Nyquil to help with sleep with some relief.     Cough  Associated symptoms include rhinorrhea and a " sore throat. Pertinent negatives include no chest pain, ear pain, myalgias, rash or shortness of breath.         Review of Systems   HENT:  Positive for congestion, rhinorrhea and sore throat. Negative for ear pain, facial swelling and sneezing.    Respiratory:  Positive for cough. Negative for choking and shortness of breath.    Cardiovascular:  Negative for chest pain.   Gastrointestinal:  Negative for abdominal pain, nausea and vomiting.   Genitourinary: Negative.    Musculoskeletal:  Negative for arthralgias and myalgias.   Skin:  Negative for rash.   Psychiatric/Behavioral: Negative.       Past Medical History   Past Medical History[1]  Past Surgical History[2]  Family History[3]  she reports that she has never smoked. She has never been exposed to tobacco smoke. She has never used smokeless tobacco. She reports that she does not currently use alcohol. She reports that she does not use drugs.  Current Outpatient Medications   Medication Instructions    amoxicillin-clavulanate (AUGMENTIN) 875-125 mg per tablet 1 tablet, Oral, 2 times daily with meals    amphetamine-dextroamphetamine (ADDERALL, 20MG,) 20 mg tablet 20 mg, Oral, 2 times daily (morning and afternoon)    benzonatate (TESSALON) 200 mg, Oral, 3 times daily PRN    fluticasone (FLONASE) 50 mcg/act nasal spray 2 sprays, Nasal, Daily    Jencycla 0.35 mg, Oral, Daily    ketoconazole (NIZORAL) 2 % shampoo Use as shampoo at least 3 times per week to scalp. Lather into scalp and let sit for 5 minutes before rinsing.   Allergies[4]     Objective   BP 99/59   Pulse 86   Temp 97.9 °F (36.6 °C) (Oral)   Resp 12   Wt 64.8 kg (142 lb 12.8 oz)   LMP 07/09/2025 (Approximate)   SpO2 99%   BMI 22.37 kg/m²      Physical Exam  Vitals and nursing note reviewed.   Constitutional:       General: She is not in acute distress.     Appearance: Normal appearance. She is normal weight. She is not ill-appearing, toxic-appearing or diaphoretic.   HENT:      Head:  "Normocephalic and atraumatic.      Right Ear: Tympanic membrane, ear canal and external ear normal.      Left Ear: Tympanic membrane, ear canal and external ear normal.      Nose: Congestion present.      Mouth/Throat:      Mouth: Mucous membranes are moist.      Pharynx: Oropharynx is clear. No oropharyngeal exudate or posterior oropharyngeal erythema.     Eyes:      General:         Right eye: No discharge.         Left eye: No discharge.      Extraocular Movements: Extraocular movements intact.      Conjunctiva/sclera: Conjunctivae normal.       Cardiovascular:      Rate and Rhythm: Normal rate and regular rhythm.      Pulses: Normal pulses.      Heart sounds: Normal heart sounds. No murmur heard.  Pulmonary:      Effort: Pulmonary effort is normal. No respiratory distress.      Breath sounds: Normal breath sounds. No wheezing, rhonchi or rales.     Musculoskeletal:         General: Normal range of motion.      Cervical back: Normal range of motion and neck supple.   Lymphadenopathy:      Cervical: No cervical adenopathy.     Skin:     General: Skin is warm and dry.      Capillary Refill: Capillary refill takes less than 2 seconds.      Findings: No erythema.     Neurological:      Mental Status: She is alert and oriented to person, place, and time. Mental status is at baseline.     Psychiatric:         Mood and Affect: Mood normal.         Behavior: Behavior normal.         Portions of the record may have been created with voice recognition software.  Occasional wrong word or \"sound a like\" substitutions may have occurred due to the inherent limitations of voice recognition software.  Read the chart carefully and recognize, using context, where substitutions have occurred.         [1]   Past Medical History:  Diagnosis Date    Complication of left ear piercing     last assessed: 09/11/17    Exposure to blood or body fluid     last assessed: 01/31/17    Moderate major depression (HCC)     last assessed: 07/25/17 "   [2]   Past Surgical History:  Procedure Laterality Date    FRACTURE SURGERY Left     femur   [3]   Family History  Problem Relation Name Age of Onset    Other Mother          Seizures    Seasonal affective disorder Mother      Gout Father      Hypertension Father      Endometriosis Maternal Aunt     [4]   Allergies  Allergen Reactions    Clarithromycin

## 2025-07-21 ENCOUNTER — OFFICE VISIT (OUTPATIENT)
Dept: PHYSICAL THERAPY | Facility: CLINIC | Age: 25
End: 2025-07-21
Attending: FAMILY MEDICINE
Payer: COMMERCIAL

## 2025-07-21 DIAGNOSIS — M25.552 LEFT HIP PAIN: Primary | ICD-10-CM

## 2025-07-21 PROCEDURE — 97110 THERAPEUTIC EXERCISES: CPT

## 2025-07-21 PROCEDURE — 97112 NEUROMUSCULAR REEDUCATION: CPT

## 2025-07-21 NOTE — PROGRESS NOTES
Daily Note     Today's date: 2025  Patient name: Nupur Coles  : 2000  MRN: [id number]  Referring provider: Lombardi, Frank, DO  Dx:   Encounter Diagnosis     ICD-10-CM    1. Left hip pain  M25.552           Start Time: 1530  Stop Time: 1715  Total time in clinic (min): 105 minutes    Subjective: Reports that she is feeling pretty good      Objective: See treatment diary below      Assessment: Tolerated treatment well. Patient would benefit from continued PT in order to increase quad, hip and glute strength. Patient reports residual soreness from Brazilian split squats last week. Nupur is able to perform all exercises  but is challenged by overturned BOSU squats with the need to use finger tip touch for balance.       Plan: Continue per plan of care.        Insurance Eval/ Re-eval POC expires Auth Status Total visits  Start date  Expiration date Misc    24  60       2/7/25 3/30/25         3/31/25 5/31/25         6/30/25 9/15/24            Precautions: anxiety  Past Medical History:   Diagnosis Date    Complication of left ear piercing     last assessed: 17    Exposure to blood or body fluid     last assessed: 17    Moderate major depression (HCC)     last assessed: 17         Date 3/31/25 4/7/25 6/30/25 6/30/25 7/7/25 7/14 7/21/25   Visit Number [id number] 13 14 15  17   Auth                    Manual          Posterior thrust of L hip           Grade V Long axis manipulation of L hip                     Neuro Re-ed          Hip burners           Clamshells      SL clam shells with Blue [id number]     Reverse clamshell with adductor squeeze x20 SLS on BOSU 10 x 10s bl   Bridge       Single leg bridges with 25# x 20 R 30L  Walking lunges with TT forward and reverse 50' x 4   Quad set     Nordic tension curl with UE assistance 10x Hs curl with peanut and arms at 45 deg angle 10# 2 x 10 Dumbell push on sliders x 20#   4x   Hip drop R  20x w/ 4#   L 15x w/ 3# Hip burners with squat  "20x bl       18\" step up R 20x L 30x 18#  Standing clamshells x 20R 30 L w/ green TB Squat on BOSu 20x   TherEx    Sliders 3 way 18# 20 R, 30 L Sliders 3 way 18# 20 R, 30 L  #, 175#, 185#, 195# 10x   NuStep warmup     Dead lift bar 45# 2x 10  TRX pullups and pushups 20x ea   Squats          Side stepping with loop    Bridge 25# 10 bl, 20 L, 10 R Hip burners with squat 2x 10     HR          SLR           LAQ BOSU ball squats 15x BOSU ball squats 2x15  Clamshells BTB 30x bl Standing pigeon pose 2 x 30 sec     SAQ           9# KB RDL 8*3 bl Circuit: 3 rounds x 10 each  - SL OH MB toss w/ kickstand RDL 10#  - bridge w/ SL HS curl     Pt ed on proprioception     ateral walks 15' x2,  monster walks, reverse skater BTB    lateral walks 15' x2,  monster walks, reverse skater BTB  lateral walks 15' x2,  monster walks, reverse skater BTB              Single leg bridge + 25# 25x b/l    RDL 9# 20x  Hip drops on foam 20x ea              Lunge KB unders 9# 10x ea TRX lunges into lateral and reverse 2x10 each   Squat 18# 20x               TherAct          Patient education                                                  Gait Training                                        Modalities          CP          Access Code: [email or web address]  URL: [email or web address]  Date: 12/03/2024  Prepared by: Gladis Cohen    Exercises  - Side Stepping with Resistance at Thighs  - 1 x daily - 7 x weekly - 3 sets - 10 reps  - Forward Monster Walk with Resistance (BKA)  - 1 x daily - 7 x weekly - 3 sets - 10 reps  - Backward Monster Walks  - 1 x daily - 7 x weekly - 3 sets - 10 reps                                                 "

## 2025-07-28 ENCOUNTER — OFFICE VISIT (OUTPATIENT)
Dept: PHYSICAL THERAPY | Facility: CLINIC | Age: 25
End: 2025-07-28
Attending: FAMILY MEDICINE
Payer: COMMERCIAL

## 2025-07-28 DIAGNOSIS — M25.552 LEFT HIP PAIN: Primary | ICD-10-CM

## 2025-07-28 PROCEDURE — 97112 NEUROMUSCULAR REEDUCATION: CPT

## 2025-07-28 PROCEDURE — 97110 THERAPEUTIC EXERCISES: CPT

## 2025-08-04 ENCOUNTER — OFFICE VISIT (OUTPATIENT)
Dept: PHYSICAL THERAPY | Facility: CLINIC | Age: 25
End: 2025-08-04
Attending: FAMILY MEDICINE
Payer: COMMERCIAL

## 2025-08-04 DIAGNOSIS — M25.552 LEFT HIP PAIN: Primary | ICD-10-CM

## 2025-08-04 PROCEDURE — 97110 THERAPEUTIC EXERCISES: CPT

## 2025-08-04 PROCEDURE — 97112 NEUROMUSCULAR REEDUCATION: CPT

## 2025-08-18 ENCOUNTER — OFFICE VISIT (OUTPATIENT)
Dept: PHYSICAL THERAPY | Facility: CLINIC | Age: 25
End: 2025-08-18
Attending: FAMILY MEDICINE
Payer: COMMERCIAL

## 2025-08-18 DIAGNOSIS — M25.552 LEFT HIP PAIN: Primary | ICD-10-CM

## 2025-08-18 PROCEDURE — 97112 NEUROMUSCULAR REEDUCATION: CPT

## 2025-08-18 PROCEDURE — 97110 THERAPEUTIC EXERCISES: CPT
